# Patient Record
Sex: FEMALE | Race: WHITE | NOT HISPANIC OR LATINO | Employment: STUDENT | URBAN - METROPOLITAN AREA
[De-identification: names, ages, dates, MRNs, and addresses within clinical notes are randomized per-mention and may not be internally consistent; named-entity substitution may affect disease eponyms.]

---
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
## 2025-01-02 ASSESSMENT — SLEEP AND FATIGUE QUESTIONNAIRES
HOW LIKELY ARE YOU TO NOD OFF OR FALL ASLEEP WHILE SITTING INACTIVE IN A PUBLIC PLACE: WOULD NEVER DOZE
HOW LIKELY ARE YOU TO NOD OFF OR FALL ASLEEP WHILE WATCHING TV: MODERATE CHANCE OF DOZING
HOW LIKELY ARE YOU TO NOD OFF OR FALL ASLEEP IN A CAR, WHILE STOPPED FOR A FEW MINUTES IN TRAFFIC: WOULD NEVER DOZE
HOW LIKELY ARE YOU TO NOD OFF OR FALL ASLEEP WHEN YOU ARE A PASSENGER IN A CAR FOR AN HOUR WITHOUT A BREAK: SLIGHT CHANCE OF DOZING
HOW LIKELY ARE YOU TO NOD OFF OR FALL ASLEEP WHILE LYING DOWN TO REST IN THE AFTERNOON WHEN CIRCUMSTANCES PERMIT: MODERATE CHANCE OF DOZING
HOW LIKELY ARE YOU TO NOD OFF OR FALL ASLEEP WHILE SITTING QUIETLY AFTER LUNCH WITHOUT ALCOHOL: MODERATE CHANCE OF DOZING
HOW LIKELY ARE YOU TO NOD OFF OR FALL ASLEEP WHILE SITTING AND TALKING TO SOMEONE: WOULD NEVER DOZE
HOW LIKELY ARE YOU TO NOD OFF OR FALL ASLEEP WHILE SITTING AND READING: SLIGHT CHANCE OF DOZING

## 2025-01-07 ENCOUNTER — VIRTUAL VISIT (OUTPATIENT)
Dept: SLEEP MEDICINE | Facility: CLINIC | Age: 20
End: 2025-01-07
Attending: INTERNAL MEDICINE
Payer: COMMERCIAL

## 2025-01-07 VITALS — BODY MASS INDEX: 22.81 KG/M2 | HEIGHT: 69 IN | WEIGHT: 154 LBS

## 2025-01-07 DIAGNOSIS — G47.33 OBSTRUCTIVE SLEEP APNEA SYNDROME, MILD: ICD-10-CM

## 2025-01-07 DIAGNOSIS — F51.04 CHRONIC INSOMNIA: Primary | ICD-10-CM

## 2025-01-07 DIAGNOSIS — G25.81 RLS (RESTLESS LEGS SYNDROME): ICD-10-CM

## 2025-01-07 DIAGNOSIS — G47.21 CIRCADIAN RHYTHM SLEEP DISORDER, DELAYED SLEEP PHASE TYPE: ICD-10-CM

## 2025-01-07 PROCEDURE — 99207 PR NO CHARGE LOS: CPT | Mod: 95 | Performed by: PSYCHOLOGIST

## 2025-01-07 ASSESSMENT — SLEEP AND FATIGUE QUESTIONNAIRES
HOW LIKELY ARE YOU TO NOD OFF OR FALL ASLEEP WHILE SITTING QUIETLY AFTER LUNCH WITHOUT ALCOHOL: MODERATE CHANCE OF DOZING
HOW LIKELY ARE YOU TO NOD OFF OR FALL ASLEEP WHILE SITTING AND TALKING TO SOMEONE: WOULD NEVER DOZE
HOW LIKELY ARE YOU TO NOD OFF OR FALL ASLEEP WHEN YOU ARE A PASSENGER IN A CAR FOR AN HOUR WITHOUT A BREAK: SLIGHT CHANCE OF DOZING
HOW LIKELY ARE YOU TO NOD OFF OR FALL ASLEEP WHILE SITTING INACTIVE IN A PUBLIC PLACE: WOULD NEVER DOZE
HOW LIKELY ARE YOU TO NOD OFF OR FALL ASLEEP WHILE SITTING AND READING: SLIGHT CHANCE OF DOZING
HOW LIKELY ARE YOU TO NOD OFF OR FALL ASLEEP WHILE WATCHING TV: MODERATE CHANCE OF DOZING
HOW LIKELY ARE YOU TO NOD OFF OR FALL ASLEEP WHILE LYING DOWN TO REST IN THE AFTERNOON WHEN CIRCUMSTANCES PERMIT: MODERATE CHANCE OF DOZING
HOW LIKELY ARE YOU TO NOD OFF OR FALL ASLEEP IN A CAR, WHILE STOPPED FOR A FEW MINUTES IN TRAFFIC: WOULD NEVER DOZE

## 2025-01-07 ASSESSMENT — PAIN SCALES - GENERAL: PAINLEVEL_OUTOF10: NO PAIN (0)

## 2025-01-07 NOTE — PATIENT INSTRUCTIONS
Recommendations and Counselin.  Establish and maintain a consistent rise time of 9 AM and continue using your sumit simulator bright light box.    2.  Reduce your time in bed from over 12 hours to a 9-hour sleep window and delay your time to bed to midnight.    3.  In the later evening hours, engage in relaxing activities but avoid use of screens, devices or your computer.  Reading and relatively low light setting and other noncomputer-based activities are fine.    4.  Continue to use your body pillow to sleep on your side.  You may also want to consider getting a Slumber Bump or other positional device that will ensure you remain in the side or lateral position for treatment of your mild sleep apnea.    5.  Follow your doctors recommendations for use of gabapentin and for iron supplementation.

## 2025-01-07 NOTE — PROGRESS NOTES
Virtual Visit Details    Type of service:  Video Visit     Originating Location (pt. Location): Home    Distant Location (provider location):  Off-site  Platform used for Video Visit: AmWell                    Visit Start Time: 9:09 AM  Visit End Time:9:34 AM     SLEEP MEDICINE CONSULTATION  Behavioral Sleep Medicine  2025    Name: Lily Albert MRN# [id number]   Age: 19 year old YOB: 2005     Date of Consultation: 2025  Consultation is requested by: Donny Slater MD  [address] [place], [place] [id number]  Primary care provider: Billie Raman    Reason for Sleep Consultation     Lily Albert is a 19 year old female seen today for a behavioral sleep medicine consultation because of insomnia    Assessment and Plan     Sleep Diagnoses:       Chronic insomnia  Delayed Sleep Phase  Obstructive sleep apnea syndrome, mild  RLS (restless legs syndrome)    Co-occurring Conditions:    GERD  Osteoporosis  Major Depressive Disorder  Generalized Anxiety Disorder  Chronic Pain    Clinical Impressions:    Lily Albert was seen for a behavioral sleep medicine consultation and possible behavioral sleep intervention and treatment. History and clinical presentation suggests chronic multifactored insomnia associated with STACI, RLS, ANKIT, MDD and chronic pain.  This evaluation also suggests a moderate and likely endogenous delay in sleep phase often seen emerging in adolescence.  Subsequently this has resulted in patient spending markedly extended time in bed and developing unhelpful sleep effort and conditioned arousal because she is going to bed at least 3 hours earlier than her natural sleep time.    Recommendations and Counselin.  Establish and maintain a consistent rise time of 9 AM and continue using your sumit simulator bright light box.    2.  Reduce your time in bed from over 12 hours to a 9-hour sleep window and delay your time to bed to midnight.    3.  In the later  evening hours, engage in relaxing activities but avoid use of screens, devices or your computer.  Reading and relatively low light setting and other noncomputer-based activities are fine.    4.  Continue to use your body pillow to sleep on your side.  You may also want to consider getting a Slumber Bump or other positional device that will ensure you remain in the side or lateral position for treatment of your mild sleep apnea.    5.  Follow your doctors recommendations for use of gabapentin and for iron supplementation.    Lily was provided information about the pathophysiology of insomnia, psychophysiological factors contributing to the onset and maintenance of insomnia and how co-occurring medical conditions and intrinsic sleep disorders can affect sleep.  Treatment options were discussed  as applicable to patient specific sleep concerns and symptoms. The benefits and potential early side effects of treatment including increased daytime sleepiness were discussed.     Patient was advised to consult with their prescribing provider around use of or changes to prescription sleep medication.  Patient was counseled on the importance of avoiding driving if drowsy.    Services provided are compliant with the requirements of [place] Statute SS 256B.0625 Subd. 3b and paragraph (b)     Follow-up: 4 weeks     History of Present Sleep Complaint     Lily Albert is a 19 year old year old female who presents with the onset of sleep difficulties about 2 years ago.  She reports that she was able to fall asleep about 9-10 PM but then began having more difficulty with sleep initiation.  She states it would often take her till midnight or later to fall asleep.  She noticed that she became more concerned about this and frustrated that she had trouble falling asleep.  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  As part of this research and care, she completed a sleep sleep study which revealed very mild and mostly positional sleep apnea.  She was seen by sleep medicine, Dr. Donny Curtis, who recommended that she use a positional device or continue using her body pillow.  In doing so she states that she feels better during the day.    She is being treated for depression and anxiety.  She reports that these are generally fairly well-controlled at this time.  She does not report nocturnal rumination or worry.  She has become more concerned about her sleep as she is very active in school and notices that if she does not sleep well she has a hard time with classes and daily activity..      Activities in bed:  Watch TV, Use phone, computer, or tablet  Prescribed or OTC stimulants:  Concerta(Methylphenidate)  Prescribed or OTC sleep medication:  Melatonin and/or Benadryl occasionally  Previous sleep medications patients has tried:      SLEEP-WAKE SCHEDULE:    Work/School Days: Patient goes to school/work:  Yes     Time to Bed:   9pm  Sleep Latency:  2-3 hours to fall asleep  Difficulty falling asleep:   7 nights per week  Number of awakenings:  0-1 times due to  Pain, Uncertain  Trouble falling back asleep  every time I wake up  times a week   Time it takes to get back to sleep:  2-3 hours  Rise time:  7am  Uses alarm?  Yes    Weekends/Non-work Days/All Other Days    Usually gets into bed at  9-10pm   Sleep latency:   2-3 hours   Rise time:  8-9am   Uses alarm?  No    Patient sleep estimates:    Average total sleep time:   7 hours on school nights, 9 on other nights   Patient estimate of sleep need:  9 hours  Preferred sleep position(s):  Side     Naps    Intentional naps:  3 times a week  Duration:   ~3 hours in duration  Feels better after a nap:  No  Unintentional [place]zin days per week  Driving accident or near-miss due to sleepiness/drowsiness?  No    SLEEP DISRUPTIONS:    Breathing/Snoring    Patient  snores: Yes  Other people complain about Her snoring:  No  Patient has been told She stops breathing in Her sleep:  Yes  She has issues with any of the following:  Morning headaches, Stuffy nose when you wake up    Movement:    Patient gets pain, discomfort, with an urge to move:  Yes  Symptoms occur when She is resting:  Yes  Symptoms occur more at night: Yes  Patient has been told She kicks Her legs at night: Yes     Behaviors in Sleep:    Lily Albert has experienced the following behaviors while sleeping:  Recurring Nightmares, See or hear things that are not really there upon awakening or just falling asleep    She has experienced sudden muscle weakness during the day:  Yes    Caffeine, Alcohol and Other Substances:    Number of caffeinated beverages(per day:  0-1  Last caffeine use is usually:    Uses alcohol to promote sleep:  No  Drinks alcohol near bedtime:  No      FAMILY HISTORY OF SLEEP DISORDERS    Patient has a family member been diagnosed with a sleep disorder:  Yes   Father, Paternal Grandmother: Sleep Apnea           SCALES     EPWORTH SLEEPINESS SCALE      Sitting and reading  1   Watching TV  2   Sitting, inactive in a public place [place]theatre [place] [facility] [place]  0    As a passenger in a car  1   Lying down to rest in the afternoon when circumstance permit  2   Sitting and talking to someone  0   Sitting quietly after lunch without alcohol  2   In a car, while stopped for a few minutes in traffic  0   TOTAL SCORE (nl <11)  8     INSOMNIA SEVERITY INDEX       Difficulty falling asleep  3   Difficult staying asleep  0   Problems waking up to early  1   How SATISFIED/DISSATISFIED are you with your CURRENT sleep pattern?  3   How NOTICEABLE to others do you think your sleep pattern is in terms of your quality of life?  2   How WORRIED/DISTRESSED are you about your current sleep pattern?  2   To what extent do you consider your sleep problem to INTERFERE with your daily fuctioning(e.g. daytime fatigue, mood,  ability to function at work/daily chores, concentration, mood,etc.) CURRENTLY?  1   INSOMNIA SEVERITY INDEX TOTAL SCORE  12    Absence of insomnia (0-7); sub-threshold insomnia (8-14); moderate insomnia (15-21); and severe insomnia (22-28)    STOP BANG     1. Snoring: Do you snore loudly (louder than talking or loud enough to be heard through closed doors)?   No    2. Tired: Do you often feel tired, fatigued, or sleepy during daytime?   Yes    3. Observed: Has anyone observed you stop breathing during your sleep?   Yes    4. Blood pressure: Do you have or are you being treated for high blood pressure?   No    5. BMI: BMI more than 35 kg/m2?   No    6. Age: Age over 50 yr old?   No    7. Neck circumference: Neck circumference greater than 40 cm?   No    8. Gender: Gender male?   No    STOP-BANG Total Score:  2    STACI Risk  0-2 Low risk for STACI  3-4  Intermediate risk for STACI  5-8 High risk      PATIENT HEALTH QUESTIONNAIRE-9 (PHQ - 9)        3/7/2024     9:59 AM 6/13/2024     9:08 AM 9/17/2024     1:08 PM   PHQ   PHQ-9 Total Score 7 6 8   Q9: Thoughts of better off dead/self-harm past 2 weeks Not at all Not at all Not at all         ANKIT-7        1/4/2024    10:05 AM 3/7/2024    10:00 AM 6/13/2024     9:08 AM   ANKIT-7 SCORE   Total Score 11 (moderate anxiety) 7 (mild anxiety) 9 (mild anxiety)   Total Score 11 7 9                    Previous Sleep Consultations/Studies   Donny Curtis MD, sleep medicine fellow, 10/4/24:    Summary Sleep Diagnoses:  Chronic insomnia  Mild sleep apnea  Restless leg syndrome mostly manifested as periodic limb movements     Comorbid Diagnoses:  Connective tissue disease  Anxiety depression        Summary Recommendations:  Reviewed all of the treatments for obstructive sleep apnea.  Given her extremely mild sleep apnea and the predilection for supine position which she generally avoids treatment options would be limited to positional therapy, CPAP, oral appliance.  She wishes to proceed  with positional therapy alone which seems quite reasonable.  Wishes to avoid CPAP and oral appliance therapy     Reviewed treatments for restless legs which are not terribly troublesome to her currently.  She is on iron replacement and gabapentin.     Low quality sleep with difficulty falling asleep consistent with insomnia.  Comorbid chronic pain and affective disorder.         Vitals     There were no vitals taken for this visit.     Medical History     Allergies:    Allergies   Allergen Reactions    Adhesive Tape     Peanut (Diagnostic)      Not anaphylactic - worsens reflux    Tomato     Flagyl [Metronidazole]      palpitations and tachycardia from the tablet, gel was tolerated.    Liquid Adhesive Dermatitis       Medications:    Current Outpatient Medications   Medication Sig Dispense Refill    acetaminophen (TYLENOL) 500 MG tablet Take 500 mg by mouth daily as needed for mild pain Pt states taking 1-2 times/week.      amoxicillin (AMOXIL) 500 MG capsule Take 2,000 mg by mouth daily as needed Before dental work      aspirin-acetaminophen-caffeine (EXCEDRIN MIGRAINE) 250-250-65 MG tablet Take 1 tablet by mouth every 6 hours as needed for headaches      bisacodyl (DULCOLAX) 5 MG EC tablet Take 5 mg by mouth daily as needed for constipation      botulinum toxin type A (BOTOX) 100 units injection every 3 (three) months.      Calcium Citrate-Vitamin D (CALCIUM + D PO) Take 1 tablet by mouth every morning Calcium citrate 400 mg, vitamin D 500 international unit(s) (per 2 tabs): 1 tab daily      cetirizine (ZYRTEC) 10 MG tablet Take 10 mg by mouth every evening PM *NOTE: this is a study drug      Cholecalciferol (VITAMIN D3) 75 MCG (3000 UT) TABS Take 1 tablet by mouth every morning      diphenhydrAMINE (BENADRYL) 25 MG capsule Take 50 mg by mouth every 4 hours.      escitalopram (LEXAPRO) 20 MG tablet TAKE 1 TABLET(20 MG) BY MOUTH AT BEDTIME 90 tablet 1    ferrous sulfate (FEROSUL) 325 (65 Fe) MG tablet Take 1 tablet  (325 mg) by mouth every other day. 45 tablet 1    gabapentin (NEURONTIN) 600 MG tablet Take 1,200 mg by mouth 2 times daily AM and PM      hyoscyamine (LEVSIN) 0.125 MG tablet TAKE 1 TABLET(125 MCG) BY MOUTH DAILY IN THE MORNING 90 tablet 2    ibuprofen (ADVIL/MOTRIN) 200 MG tablet Take 400 mg by mouth daily as needed for mild pain      losartan (COZAAR) 25 MG tablet Take 25 mg by mouth 2 times daily      MAGNESIUM GLYCINATE PO Take 400 mg by mouth daily Evening dosing      methocarbamol (ROBAXIN) 750 MG tablet Take 750 mg by mouth.      methylphenidate HCl ER, OSM, (CONCERTA) 18 MG CR tablet Take 1 tablet (18 mg) by mouth daily. 30 tablet 0    [START ON 1/25/2025] methylphenidate HCl ER, OSM, (CONCERTA) 18 MG CR tablet Take 1 tablet (18 mg) by mouth daily. 30 tablet 0    [START ON 2/24/2025] methylphenidate HCl ER, OSM, (CONCERTA) 18 MG CR tablet Take 1 tablet (18 mg) by mouth daily. 30 tablet 0    methylphenidate HCl ER, OSM, (CONCERTA) 18 MG CR tablet Take 1 tablet (18 mg) by mouth every morning. 30 tablet 0    Multiple Vitamins-Minerals (DAILY MULTI VITAMIN/MINERALS PO) Take 1 split tablet by mouth every evening      OnabotulinumtoxinA (BOTOX IJ) Every 13 weeks      ondansetron (ZOFRAN) 8 MG tablet Take 8 mg by mouth every 8 hours as needed for nausea      oxyCODONE (ROXICODONE) 5 MG tablet Take 5 mg by mouth.      propranolol (INDERAL) 20 MG tablet Take 1 and 1/2 tablets by mouth every day as needed for palpitations      Riboflavin (VITAMIN B2 PO) Take 400 mg by mouth daily      rimegepant (NURTEC) 75 MG ODT tablet Take 75 mg by mouth daily as needed for migraine      senna-docusate (SENOKOT-S/PERICOLACE) 8.6-50 MG tablet Take 2 tablets by mouth daily At bedtime      tretinoin (RETIN-A) 0.025 % external cream Apply topically at bedtime Prescribed by [facility]         Problem List:  Patient Active Problem List    Diagnosis Date Noted    Nonrheumatic aortic (valve) insufficiency 10/04/2024      Priority: Medium    Insomnia due to medical condition 10/04/2024     Priority: Medium    Migraine headache 10/10/2022     Priority: Medium    Chronic psychogenic pain 09/29/2022     Priority: Medium    Other malaise 09/29/2022     Priority: Medium    Sacroiliac joint pain 06/24/2022     Priority: Medium    Suicide ideation 05/18/2022     Priority: Medium    Abnormality on bone densitometry 04/28/2022     Priority: Medium    Anemia due to chronic blood loss 04/28/2022     Priority: Medium    Generalized anxiety disorder 03/29/2022     Priority: Medium    Aortic root dilatation (H) 03/29/2022     Priority: Medium    Congenital anomaly of heart 03/29/2022     Priority: Medium    Gastroesophageal reflux disease 03/29/2022     Priority: Medium    History of traumatic brain injury 03/29/2022     Priority: Medium    Major depressive disorder, single episode, severe without psychotic features (H) 03/29/2022     Priority: Medium    Pain of foot 03/29/2022     Priority: Medium    Scoliosis 03/29/2022     Priority: Medium    Winged scapula 03/29/2022     Priority: Medium    Hypocalcemia 03/13/2021     Priority: Medium    Postprocedural hematoma of skin and subcutaneous tissue following other procedure 03/13/2021     Priority: Medium    Presence of other vascular implants and grafts 03/13/2021     Priority: Medium    Postoperative pain 03/11/2021     Priority: Medium    Tricuspid valve insufficiency 03/08/2021     Priority: Medium    Prolonged QT interval 03/08/2021     Priority: Medium    Thoracic aortic aneurysm (TAA) (H) 01/22/2021     Priority: Medium    Osteoporosis 08/05/2020     Priority: Medium    Pain in back 02/03/2020     Priority: Medium    Congenital deformity of spine 02/03/2020     Priority: Medium    Other specified systemic involvement of connective tissue (H) 02/03/2020     Priority: Medium     Formatting of this note might be different from the original.  Loeys Milady Syndrome  Formatting of this note might be  different from the original.  Loeys Milady Syndrome      Pectus carinatum 02/03/2020     Priority: Medium    Constipation 07/02/2018     Priority: Medium     Formatting of this note might be different from the original.  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  Loeys-Milady syndrome      Intracranial hemorrhage following injury (H) 03/06/2013     Priority: Medium        Past Medical/Surgical History:  Past Medical History:   Diagnosis Date    Chondral defect of femoral condyle     Chronic pain     Constipation     Depressive disorder     Eosinophilic esophagitis     ANKIT (generalized anxiety disorder)     Gastroesophageal reflux disease with esophagitis     History of blood transfusion 3/10/21    Heart Surgery    Loeys-Milady syndrome     Migraine with aura     Osteoporosis     Pectus carinatum     Scoliosis     Splenic cyst     Status post cardiac surgery     s/p Mitral valve repair in 2016, valve sparing aortic root replacement, tricuspid valve repair 3/2021     Patient Active Problem List    Diagnosis Date Noted    Nonrheumatic aortic (valve) insufficiency 10/04/2024     Priority: Medium    Insomnia due to medical condition 10/04/2024     Priority: Medium    Migraine headache 10/10/2022     Priority: Medium    Chronic psychogenic pain 09/29/2022     Priority: Medium    Other malaise 09/29/2022     Priority: Medium    Sacroiliac joint pain 06/24/2022     Priority: Medium    Suicide ideation 05/18/2022     Priority: Medium    Abnormality on bone densitometry 04/28/2022     Priority: Medium    Anemia due to chronic blood  loss 04/28/2022     Priority: Medium    Generalized anxiety disorder 03/29/2022     Priority: Medium    Aortic root dilatation (H) 03/29/2022     Priority: Medium    Congenital anomaly of heart 03/29/2022     Priority: Medium    Gastroesophageal reflux disease 03/29/2022     Priority: Medium    History of traumatic brain injury 03/29/2022     Priority: Medium    Major depressive disorder, single episode, severe without psychotic features (H) 03/29/2022     Priority: Medium    Pain of foot 03/29/2022     Priority: Medium    Scoliosis 03/29/2022     Priority: Medium    Winged scapula 03/29/2022     Priority: Medium    Hypocalcemia 03/13/2021     Priority: Medium    Postprocedural hematoma of skin and subcutaneous tissue following other procedure 03/13/2021     Priority: Medium    Presence of other vascular implants and grafts 03/13/2021     Priority: Medium    Postoperative pain 03/11/2021     Priority: Medium    Tricuspid valve insufficiency 03/08/2021     Priority: Medium    Prolonged QT interval 03/08/2021     Priority: Medium    Thoracic aortic aneurysm (TAA) (H) 01/22/2021     Priority: Medium    Osteoporosis 08/05/2020     Priority: Medium    Pain in back 02/03/2020     Priority: Medium    Congenital deformity of spine 02/03/2020     Priority: Medium    Other specified systemic involvement of connective tissue (H) 02/03/2020     Priority: Medium     Formatting of this note might be different from the original.  Loeys Milady Syndrome  Formatting of this note might be different from the original.  Loeys Milady Syndrome      Pectus carinatum 02/03/2020     Priority: Medium    Constipation 07/02/2018     Priority: Medium     Formatting of this note might be different from the original.  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  Loeys-Milady syndrome      Intracranial hemorrhage following injury (H) 03/06/2013     Priority: Medium     Patient Active Problem List   Diagnosis    Loeys-Milady syndrome    Generalized anxiety disorder    Aortic root dilatation (H)    Pain in back    Congenital anomaly of heart    Congenital deformity of spine    Other specified systemic involvement of connective tissue (H)    Constipation    Eosinophilic esophagitis    FTT (failure to thrive) in child    Gastroesophageal reflux disease    History of traumatic brain injury    Hypocalcemia    Intracranial hemorrhage following injury (H)    Major depressive disorder, single episode, severe without psychotic features (H)    Tricuspid valve insufficiency    Osteoporosis    Other specified congenital malformation syndromes, not elsewhere classified    Pain of foot    Pectus carinatum    Postoperative pain    Postprocedural hematoma of skin and subcutaneous tissue following other procedure    Presence of other vascular implants and grafts    Prolonged QT interval    Scoliosis    Thoracic aortic aneurysm (TAA) (H)    Winged scapula    Abnormality on bone densitometry    Anemia due to chronic blood loss    Suicide ideation    Sacroiliac joint pain    Chronic psychogenic pain    Migraine headache    Other malaise    Nonrheumatic aortic (valve) insufficiency    Insomnia due to medical condition        Most Recent Labs:  Lab on 09/05/2024   Component Date Value Ref Range Status    Color Urine 09/05/2024 Yellow  Colorless, Straw, Light Yellow, Yellow Final    Appearance Urine 09/05/2024 Clear  Clear Final    Glucose Urine 09/05/2024 Negative  Negative mg/dL Final    Bilirubin Urine 09/05/2024 Negative  Negative Final    Ketones Urine 09/05/2024 Negative  Negative mg/dL  Final    Specific Gravity Urine 09/05/2024 1.015  1.003 - 1.035 Final    Blood Urine 09/05/2024 Negative  Negative Final    pH Urine 09/05/2024 7.0  5.0 - 7.0 Final    Protein Albumin Urine 09/05/2024 Negative  Negative mg/dL Final    Urobilinogen Urine 09/05/2024 0.2  0.2, 1.0 E.U./dL Final    Nitrite Urine 09/05/2024 Negative  Negative Final    Leukocyte Esterase Urine 09/05/2024 Negative  Negative Final    Total Protein Urine mg/dL 09/05/2024 9.0    mg/dL Final    The reference ranges have not been established in urine protein. The results should be integrated into the clinical context for interpretation.    Total Protein Urine mg/mg Creat 09/05/2024 0.12  0.00 - 0.20 mg/mg Cr Final    Creatinine Urine mg/dL 09/05/2024 74.5  mg/dL Final    The reference ranges have not been established in urine creatinine. The results should be integrated into the clinical context for interpretation.  The reference ranges have not been established in urine creatinine. The results should be integrated into the clinical context for interpretation.    Chlamydia Trachomatis 09/05/2024 Negative  Negative Final    Negative for C. trachomatis rRNA by transcription mediated amplification.   A negative result by transcription mediated amplification does not preclude the presence of infection because results are dependent on proper and adequate collection, absence of inhibitors and sufficient rRNA to be detected.    Neisseria gonorrhoeae 09/05/2024 Negative  Negative Final    Negative for N. gonorrhoeae rRNA by transcription mediated amplification. A negative result by transcription mediated amplification does not preclude the presence of C. trachomatis infection because results are dependent on proper and adequate collection, absence of inhibitors and sufficient rRNA to be detected.    Calcium Urine mg/dL 09/05/2024 17.2  mg/dL Final    The reference ranges have not been established in urine calcium. The results should be integrated into  the clinical context for interpretation.    Calcium Urine g/g Cr 09/05/2024 0.23  0.05 - 0.27 g/g Cr Final    Creatinine Urine mg/dL 09/05/2024 74.5  mg/dL Final    The reference ranges have not been established in urine creatinine. The results should be integrated into the clinical context for interpretation.       Mental Health History     Prior Mental Health Diagnosis:   Major Depressive Disorder, Generalized Anxieety Disorder    Mental Health Treatment:   Primary care management, counseling    Chemical Abuse/Treatment:    None reported      10/4/2024     9:30 AM   CAGE-AID Total Score   Total Score 0   Total Score MyChart 0 (A total score of 2 or greater is considered clinically significant)     Family History     Family History   Problem Relation Age of Onset    Hypertension Father     Thyroid Disease Father     Asthma Brother     Breast Cancer Maternal Grandmother     Colon Cancer Maternal Grandmother     Prostate Cancer Maternal Grandfather     Diabetes Maternal Grandfather     Thyroid Disease Paternal Grandmother     Colon Polyps Maternal Aunt     Cervical Cancer Maternal Aunt     Anesthesia Reaction Maternal Aunt         Slow to wake    Venous thrombosis No family hx of        Social History         Social Drivers of Health     Food Insecurity: No Food Insecurity (5/30/2024)    Received from [facility]    Hunger Vital Sign     Worried About Running Out of Food in the Last Year: Never true     Ran Out of Food in the Last Year: Never true   Depression: Not at risk (10/4/2024)    PHQ-2     PHQ-2 Score: 1   Housing Stability: Low Risk  (5/30/2024)    Received from [facility]    Housing Stability     What is your living situation today?: I have a steady place to live   Tobacco Use: Low Risk  (10/4/2024)    Patient History     Smoking Tobacco Use: Never     Smokeless Tobacco Use: Never     Passive Exposure: Never   Financial Resource Strain: Low Risk  (1/4/2024)    Financial Resource Strain     Within the past  12 months, have you or your family members you live with been unable to get utilities (heat, electricity) when it was really needed?: No   Alcohol Use: Not At Risk (3/8/2021)    Received from [facility]    AUDIT-C     Frequency of Alcohol Consumption: Never     Average Number of Drinks: Not on file     Frequency of Binge Drinking: Not on file   Transportation Needs: No Transportation Needs (5/30/2024)    Received from [facility]    PRAPARE - Transportation     Lack of Transportation (Medical): No     Lack of Transportation (Non-Medical): No   Physical Activity: Insufficiently Active (5/30/2024)    Received from [facility]    Exercise Vital Sign     Days of Exercise per Week: 2 days     Minutes of Exercise per Session: 20 min   Interpersonal Safety: Low Risk  (4/5/2024)    Interpersonal Safety     Do you feel physically and emotionally safe where you currently live?: Yes     Within the past 12 months, have you been hit, slapped, kicked or otherwise physically hurt by someone?: No     Within the past 12 months, have you been humiliated or emotionally abused in other ways by your partner or ex-partner?: No   Stress: Stress Concern Present (1/16/2023)    Received from [facility], [facility]    [employer] - Occupational Stress Questionnaire     Feeling of Stress : Rather much   Social Connections: Not on file   Health Literacy: Not on file         Mental Status Examination     Lily presented as oriented X3 with speech and language intact.  The patient was cooperative throughout the evaluation with no signs of hallucinations, delusions, loosening of associations or other thought disturbance.  Mood was normal Affect was congruent with mood. Insight and judgement were intact.  Memory appeared intact for recent and remote elements.  There was no report of suicidal ideation, intention or plan. Attention and concentration were within normal.        Castro Moody,  ERMELINDA Au, PAYALM  Diplomate, Behavioral Sleep Medicine  [facility]      Copy:   Billie Raman MD  [place] [address]    Note: This dictation was created using voice recognition software. This document may contain an error not identified before finalizing the document. If the error changes the accuracy of the document, I would appreciate it being brought to my attention.

## 2025-01-07 NOTE — NURSING NOTE
Current patient location:  [place], [place]    Is the patient currently in the state of [place]? YES    Visit mode:VIDEO    If the visit is dropped, the patient can be reconnected by:VIDEO VISIT: Text to cell phone:   Telephone Information:   Mobile [phone]       Will anyone else be joining the visit? NO  (If patient encounters technical issues they should call [phone]    Are changes needed to the allergy or medication list? No    Are refills needed on medications prescribed by this physician? NO    Rooming Documentation:  Questionnaire(s) completed    Reason for visit: Consult    Sangeeta JOHANSEN

## 2025-01-28 ENCOUNTER — APPOINTMENT (OUTPATIENT)
Dept: URBAN - METROPOLITAN AREA CLINIC 260 | Age: 20
Setting detail: DERMATOLOGY
End: 2025-01-28

## 2025-01-28 VITALS — HEIGHT: 69 IN | WEIGHT: 160 LBS

## 2025-01-28 DIAGNOSIS — L72.0 EPIDERMAL CYST: ICD-10-CM

## 2025-01-28 DIAGNOSIS — L70.0 ACNE VULGARIS: ICD-10-CM

## 2025-01-28 PROCEDURE — 10040 ACNE SURGERY: CPT

## 2025-01-28 PROCEDURE — OTHER COUNSELING: OTHER

## 2025-01-28 PROCEDURE — OTHER ACNE SURGERY: OTHER

## 2025-01-28 PROCEDURE — OTHER MIPS QUALITY: OTHER

## 2025-01-28 PROCEDURE — OTHER PRESCRIPTION MEDICATION MANAGEMENT: OTHER

## 2025-01-28 PROCEDURE — 99213 OFFICE O/P EST LOW 20 MIN: CPT | Mod: 25

## 2025-01-28 ASSESSMENT — LOCATION DETAILED DESCRIPTION DERM
LOCATION DETAILED: RIGHT INFERIOR MEDIAL FOREHEAD
LOCATION DETAILED: LEFT MEDIAL MALAR CHEEK
LOCATION DETAILED: RIGHT SUPERIOR NASAL CHEEK
LOCATION DETAILED: LEFT INFERIOR FOREHEAD
LOCATION DETAILED: LEFT CENTRAL MALAR CHEEK
LOCATION DETAILED: RIGHT CENTRAL MALAR CHEEK

## 2025-01-28 ASSESSMENT — LOCATION SIMPLE DESCRIPTION DERM
LOCATION SIMPLE: LEFT CHEEK
LOCATION SIMPLE: RIGHT CHEEK
LOCATION SIMPLE: RIGHT FOREHEAD
LOCATION SIMPLE: LEFT FOREHEAD

## 2025-01-28 ASSESSMENT — LOCATION ZONE DERM: LOCATION ZONE: FACE

## 2025-01-28 NOTE — PROCEDURE: COUNSELING
Topical Clindamycin Pregnancy And Lactation Text: This medication is Pregnancy Category B and is considered safe during pregnancy. It is unknown if it is excreted in breast milk.
Dapsone Pregnancy And Lactation Text: This medication is Pregnancy Category C and is not considered safe during pregnancy or breast feeding.
Azithromycin Counseling:  I discussed with the patient the risks of azithromycin including but not limited to GI upset, allergic reaction, drug rash, diarrhea, and yeast infections.
High Dose Vitamin A Counseling: Side effects reviewed, pt to contact office should one occur.
Spironolactone Pregnancy And Lactation Text: This medication can cause feminization of the male fetus and should be avoided during pregnancy. The active metabolite is also found in breast milk.
Tetracycline Pregnancy And Lactation Text: This medication is Pregnancy Category D and not consider safe during pregnancy. It is also excreted in breast milk.
Minocycline Counseling: Patient advised regarding possible photosensitivity and discoloration of the teeth, skin, lips, tongue and gums.  Patient instructed to avoid sunlight, if possible.  When exposed to sunlight, patients should wear protective clothing, sunglasses, and sunscreen.  The patient was instructed to call the office immediately if the following severe adverse effects occur:  hearing changes, easy bruising/bleeding, severe headache, or vision changes.  The patient verbalized understanding of the proper use and possible adverse effects of minocycline.  All of the patient's questions and concerns were addressed.
Bactrim Counseling:  I discussed with the patient the risks of sulfa antibiotics including but not limited to GI upset, allergic reaction, drug rash, diarrhea, dizziness, photosensitivity, and yeast infections.  Rarely, more serious reactions can occur including but not limited to aplastic anemia, agranulocytosis, methemoglobinemia, blood dyscrasias, liver or kidney failure, lung infiltrates or desquamative/blistering drug rashes.
Doxycycline Pregnancy And Lactation Text: This medication is Pregnancy Category D and not consider safe during pregnancy. It is also excreted in breast milk but is considered safe for shorter treatment courses.
Topical Retinoid counseling:  Patient advised to apply a pea-sized amount only at bedtime and wait 30 minutes after washing their face before applying.  If too drying, patient may add a non-comedogenic moisturizer. The patient verbalized understanding of the proper use and possible adverse effects of retinoids.  All of the patient's questions and concerns were addressed.
Benzoyl Peroxide Counseling: Patient counseled that medicine may cause skin irritation and bleach clothing.  In the event of skin irritation, the patient was advised to reduce the amount of the drug applied or use it less frequently.   The patient verbalized understanding of the proper use and possible adverse effects of benzoyl peroxide.  All of the patient's questions and concerns were addressed.
Topical Sulfur Applications Pregnancy And Lactation Text: This medication is Pregnancy Category C and has an unknown safety profile during pregnancy. It is unknown if this topical medication is excreted in breast milk.
Bactrim Pregnancy And Lactation Text: This medication is Pregnancy Category D and is known to cause fetal risk.  It is also excreted in breast milk.
Erythromycin Counseling:  I discussed with the patient the risks of erythromycin including but not limited to GI upset, allergic reaction, drug rash, diarrhea, increase in liver enzymes, and yeast infections.
Winlevi Counseling:  I discussed with the patient the risks of topical clascoterone including but not limited to erythema, scaling, itching, and stinging. Patient voiced their understanding.
Topical Retinoid Pregnancy And Lactation Text: This medication is Pregnancy Category C. It is unknown if this medication is excreted in breast milk.
Aklief counseling:  Patient advised to apply a pea-sized amount only at bedtime and wait 30 minutes after washing their face before applying.  If too drying, patient may add a non-comedogenic moisturizer.  The most commonly reported side effects including irritation, redness, scaling, dryness, stinging, burning, itching, and increased risk of sunburn.  The patient verbalized understanding of the proper use and possible adverse effects of retinoids.  All of the patient's questions and concerns were addressed.
Birth Control Pills Pregnancy And Lactation Text: This medication should be avoided if pregnant and for the first 30 days post-partum.
Isotretinoin Counseling: Patient should get monthly blood tests, not donate blood, not drive at night if vision affected, not share medication, and not undergo elective surgery for 6 months after tx completed. Side effects reviewed, pt to contact office should one occur.
Azelaic Acid Counseling: Patient counseled that medicine may cause skin irritation and to avoid applying near the eyes.  In the event of skin irritation, the patient was advised to reduce the amount of the drug applied or use it less frequently.   The patient verbalized understanding of the proper use and possible adverse effects of azelaic acid.  All of the patient's questions and concerns were addressed.
Tazorac Pregnancy And Lactation Text: This medication is not safe during pregnancy. It is unknown if this medication is excreted in breast milk.
Benzoyl Peroxide Pregnancy And Lactation Text: This medication is Pregnancy Category C. It is unknown if benzoyl peroxide is excreted in breast milk.
Tetracycline Counseling: Patient counseled regarding possible photosensitivity and increased risk for sunburn.  Patient instructed to avoid sunlight, if possible.  When exposed to sunlight, patients should wear protective clothing, sunglasses, and sunscreen.  The patient was instructed to call the office immediately if the following severe adverse effects occur:  hearing changes, easy bruising/bleeding, severe headache, or vision changes.  The patient verbalized understanding of the proper use and possible adverse effects of tetracycline.  All of the patient's questions and concerns were addressed. Patient understands to avoid pregnancy while on therapy due to potential birth defects.
Azithromycin Pregnancy And Lactation Text: This medication is considered safe during pregnancy and is also secreted in breast milk.
Doxycycline Counseling:  Patient counseled regarding possible photosensitivity and increased risk for sunburn.  Patient instructed to avoid sunlight, if possible.  When exposed to sunlight, patients should wear protective clothing, sunglasses, and sunscreen.  The patient was instructed to call the office immediately if the following severe adverse effects occur:  hearing changes, easy bruising/bleeding, severe headache, or vision changes.  The patient verbalized understanding of the proper use and possible adverse effects of doxycycline.  All of the patient's questions and concerns were addressed.
High Dose Vitamin A Pregnancy And Lactation Text: High dose vitamin A therapy is contraindicated during pregnancy and breast feeding.
Topical Clindamycin Counseling: Patient counseled that this medication may cause skin irritation or allergic reactions.  In the event of skin irritation, the patient was advised to reduce the amount of the drug applied or use it less frequently.   The patient verbalized understanding of the proper use and possible adverse effects of clindamycin.  All of the patient's questions and concerns were addressed.
Spironolactone Counseling: Patient advised regarding risks of diarrhea, abdominal pain, hyperkalemia, birth defects (for female patients), liver toxicity and renal toxicity. The patient may need blood work to monitor liver and kidney function and potassium levels while on therapy. The patient verbalized understanding of the proper use and possible adverse effects of spironolactone.  All of the patient's questions and concerns were addressed.
Azelaic Acid Pregnancy And Lactation Text: This medication is considered safe during pregnancy and breast feeding.
Dapsone Counseling: I discussed with the patient the risks of dapsone including but not limited to hemolytic anemia, agranulocytosis, rashes, methemoglobinemia, kidney failure, peripheral neuropathy, headaches, GI upset, and liver toxicity.  Patients who start dapsone require monitoring including baseline LFTs and weekly CBCs for the first month, then every month thereafter.  The patient verbalized understanding of the proper use and possible adverse effects of dapsone.  All of the patient's questions and concerns were addressed.
Isotretinoin Pregnancy And Lactation Text: This medication is Pregnancy Category X and is considered extremely dangerous during pregnancy. It is unknown if it is excreted in breast milk.
Include Pregnancy/Lactation Warning?: No
Detail Level: Zone
Topical Sulfur Applications Counseling: Topical Sulfur Counseling: Patient counseled that this medication may cause skin irritation or allergic reactions.  In the event of skin irritation, the patient was advised to reduce the amount of the drug applied or use it less frequently.   The patient verbalized understanding of the proper use and possible adverse effects of topical sulfur application.  All of the patient's questions and concerns were addressed.
Tazorac Counseling:  Patient advised that medication is irritating and drying.  Patient may need to apply sparingly and wash off after an hour before eventually leaving it on overnight.  The patient verbalized understanding of the proper use and possible adverse effects of tazorac.  All of the patient's questions and concerns were addressed.
Birth Control Pills Counseling: Birth Control Pill Counseling: I discussed with the patient the potential side effects of OCPs including but not limited to increased risk of stroke, heart attack, thrombophlebitis, deep venous thrombosis, hepatic adenomas, breast changes, GI upset, headaches, and depression.  The patient verbalized understanding of the proper use and possible adverse effects of OCPs. All of the patient's questions and concerns were addressed.
Erythromycin Pregnancy And Lactation Text: This medication is Pregnancy Category B and is considered safe during pregnancy. It is also excreted in breast milk.
Winlevi Pregnancy And Lactation Text: This medication is considered safe during pregnancy and breastfeeding.
Aklief Pregnancy And Lactation Text: It is unknown if this medication is safe to use during pregnancy.  It is unknown if this medication is excreted in breast milk.  Breastfeeding women should use the topical cream on the smallest area of the skin for the shortest time needed while breastfeeding.  Do not apply to nipple and areola.
Sarecycline Counseling: Patient advised regarding possible photosensitivity and discoloration of the teeth, skin, lips, tongue and gums.  Patient instructed to avoid sunlight, if possible.  When exposed to sunlight, patients should wear protective clothing, sunglasses, and sunscreen.  The patient was instructed to call the office immediately if the following severe adverse effects occur:  hearing changes, easy bruising/bleeding, severe headache, or vision changes.  The patient verbalized understanding of the proper use and possible adverse effects of sarecycline.  All of the patient's questions and concerns were addressed.

## 2025-01-28 NOTE — PROCEDURE: ACNE SURGERY
Render Post-Care Instructions In Note?: no
Extraction Method: 11 blade and q-tip
Post-Care Instructions: I reviewed with the patient in detail post-care instructions. Patient is to keep the treatment areas dry overnight, and then apply bacitracin twice daily until healed. Patient may apply hydrogen peroxide soaks to remove any crusting.
Prep Text (Optional): Prior to removal the treatment areas were prepped in the usual fashion.
Consent was obtained and risks were reviewed including but not limited to scarring, infection, bleeding, scabbing, incomplete removal, and allergy to anesthesia.
Acne Type: Comedonal Lesions
Detail Level: Detailed

## 2025-02-03 ASSESSMENT — SLEEP AND FATIGUE QUESTIONNAIRES
HOW LIKELY ARE YOU TO NOD OFF OR FALL ASLEEP WHILE SITTING AND READING: SLIGHT CHANCE OF DOZING
HOW LIKELY ARE YOU TO NOD OFF OR FALL ASLEEP WHILE SITTING AND TALKING TO SOMEONE: WOULD NEVER DOZE
HOW LIKELY ARE YOU TO NOD OFF OR FALL ASLEEP IN A CAR, WHILE STOPPED FOR A FEW MINUTES IN TRAFFIC: WOULD NEVER DOZE
HOW LIKELY ARE YOU TO NOD OFF OR FALL ASLEEP WHEN YOU ARE A PASSENGER IN A CAR FOR AN HOUR WITHOUT A BREAK: SLIGHT CHANCE OF DOZING
HOW LIKELY ARE YOU TO NOD OFF OR FALL ASLEEP WHILE SITTING INACTIVE IN A PUBLIC PLACE: WOULD NEVER DOZE
HOW LIKELY ARE YOU TO NOD OFF OR FALL ASLEEP WHILE WATCHING TV: MODERATE CHANCE OF DOZING
HOW LIKELY ARE YOU TO NOD OFF OR FALL ASLEEP WHILE LYING DOWN TO REST IN THE AFTERNOON WHEN CIRCUMSTANCES PERMIT: MODERATE CHANCE OF DOZING
HOW LIKELY ARE YOU TO NOD OFF OR FALL ASLEEP WHILE SITTING QUIETLY AFTER LUNCH WITHOUT ALCOHOL: MODERATE CHANCE OF DOZING

## 2025-02-04 ENCOUNTER — VIRTUAL VISIT (OUTPATIENT)
Dept: SLEEP MEDICINE | Facility: CLINIC | Age: 20
End: 2025-02-04
Payer: COMMERCIAL

## 2025-02-04 ENCOUNTER — TELEPHONE (OUTPATIENT)
Dept: BEHAVIORAL HEALTH | Facility: CLINIC | Age: 20
End: 2025-02-04

## 2025-02-04 VITALS — HEIGHT: 69 IN | WEIGHT: 150 LBS | BODY MASS INDEX: 22.22 KG/M2

## 2025-02-04 DIAGNOSIS — G47.33 OBSTRUCTIVE SLEEP APNEA SYNDROME, MILD: ICD-10-CM

## 2025-02-04 DIAGNOSIS — F51.04 CHRONIC INSOMNIA: Primary | ICD-10-CM

## 2025-02-04 PROCEDURE — 90832 PSYTX W PT 30 MINUTES: CPT | Mod: 95 | Performed by: PSYCHOLOGIST

## 2025-02-04 ASSESSMENT — PAIN SCALES - GENERAL: PAINLEVEL_OUTOF10: MILD PAIN (3)

## 2025-02-04 NOTE — TELEPHONE ENCOUNTER
Pt reminder call for 02/05 TC appointment. VM left with TC info.     Sonya Avila  02/04/2025  2024

## 2025-02-04 NOTE — PROGRESS NOTES
Virtual Visit Details    Type of service:  Video Visit     Originating Location (pt. Location): Home    Distant Location (provider location):  Off-site  Platform used for Video Visit: AmWell          Visit Start Time: 9:31 AM  Visit End Time:9:50 AM       SLEEP MEDICINE VIRTUAL FOLLOW-UP VISIT  Behavioral Sleep Medicine    Patient Name: Lily Albert  MRN:  [id number]  Date of Service: 2025       Subjective Report     Lily Albert  returns for a telehealth video visit to discuss progress in implementing behavioral strategies for the management of insomnia and restless legs with mild sleep apnea .  Patient consent for initiation of video visit was obtained and documented prior to initiation of visit.     Lily reports she began iron supplementation which has helped her RLS significantly.  She has .  Also implement stimulus control strategies at reducing use of screens and devices in the later evening and avoiding going to bed until she feels sleepy.  She states that the process of going to bed is more comfortable because of the reduction in symptoms in her legs.  She is meeting with her primary care for follow-up to check her iron and ferritin levels in April.     Initial Recommendations and Counselin.  Establish and maintain a consistent rise time of 9 AM and continue using your sumit simulator bright light box.     2.  Reduce your time in bed from over 12 hours to a 9-hour sleep window and delay your time to bed to midnight.     3.  In the later evening hours, engage in relaxing activities but avoid use of screens, devices or your computer.  Reading and relatively low light setting and other noncomputer-based activities are fine.     4.  Continue to use your body pillow to sleep on your side.  You may also want to consider getting a Slumber Bump or other positional device that will ensure you remain in the side or lateral position for treatment of your mild sleep apnea.     5.  Follow  your doctors recommendations for use of gabapentin and for iron supplementation.     Sleep Data:     Source of Sleep Estimates:  Verbal Self-report    Average Time in Bed: 7-8 hrs.  Average Total Sleep Time: 7 hrs  Sleep Efficiency estimate: Greater than 85%      EPWORTH SLEEPINESS SCALE    Sitting and reading  1   Watching TV  2   Sitting, inactive in a public place (theatre or mtg.)  0    As a passenger in a car  1   Lying down to rest in the afternoon when circumstance permit  2   Sitting and talking to someone  0   Sitting quietly after lunch without alcohol  2   In a car, while stopped for a few minutes in traffic  0   TOTAL SCORE (nl <11)  8     INSOMNIA SEVERITY INDEX     Difficulty falling asleep  1   Difficult staying asleep  0   Problems waking up to early  1   How SATISFIED/DISSATISFIED are you with your CURRENT sleep pattern?  2   How NOTICEABLE to others do you think your sleep pattern is in terms of your quality of life?  1   How WORRIED/DISTRESSED are you about your current sleep pattern?  2   To what extent do you consider your sleep problem to INTERFERE with your daily fuctioning(e.g. daytime fatigue, mood, ability to function at work/daily chores, concentration, mood,etc.) CURRENTLY?  1   INSOMNIA SEVERITY INDEX TOTAL SCORE  8    Absence of insomnia (0-7); sub-threshold insomnia (8-14); moderate insomnia (15-21); and severe insomnia (22-28)     Interventions     Strategies and recommendations including  treatment of restless leg syndrome, positional therapy for sleep apnea and Stimulus control therapy were reviewed and discussed today.     Services provided are compliant with the requirements of [place] Statute SS 256B.0625 Subd. 3b and paragraph (b)       Vital Signs     There were no vitals taken for this visit.     Mental Status     Orientation:  X3  Mood:  normal  Affect:  Congruent with mood  Speech/Language:  Normal  Thought Process: Intact  Associations:  Normal  Thought Content:  Normal  Patient does not report any suicidal ideation, intention or plan.    Diagnostic Impressions and Plan        Chronic insomnia  Obstructive sleep apnea syndrome, mild    At follow-up, patient reports she began iron supplementation and is finding it very helpful.  She reports virtual elimination of discomforting restlessness and uncomfortable sensations in the evening and making it easier to fall asleep.  She has also implemented stimulus control recommendations from behavioral sleep medicine clinic and is waiting to go to bed and she feels sleepy.  She is keeping a consistent wake time.  There is near complete resolution of insomnia symptoms.    Plan:  Continue current sleep schedule and plan    Follow-up: as needed if symptoms recur      Castro Moody PsyD, ERMELINDA, DBSM  Diplomate, Behavioral Sleep Medicine  [facility]      Note: This dictation was created using voice recognition software. This document may contain an error not identified before finalizing the document. If the error changes the accuracy of the document, I would appreciate it being brought to my attention.

## 2025-02-04 NOTE — NURSING NOTE
Current patient location:  Pt in [place]     Is the patient currently in the state of [place]? YES    Visit mode: VIDEO    If the visit is dropped, the patient can be reconnected by:VIDEO VISIT: Send to e-mail at: [email or web address]    Will anyone else be joining the visit? NO  (If patient encounters technical issues they should call [phone]    Are changes needed to the allergy or medication list? No    Are refills needed on medications prescribed by this physician? NO    Rooming Documentation:  Questionnaire(s) completed    Reason for visit: RECHECK    Jo JOHANSEN

## 2025-02-05 ENCOUNTER — VIRTUAL VISIT (OUTPATIENT)
Dept: BEHAVIORAL HEALTH | Facility: CLINIC | Age: 20
End: 2025-02-05
Payer: COMMERCIAL

## 2025-02-05 ENCOUNTER — TELEPHONE (OUTPATIENT)
Dept: BEHAVIORAL HEALTH | Facility: CLINIC | Age: 20
End: 2025-02-05
Payer: COMMERCIAL

## 2025-02-05 DIAGNOSIS — F41.1 GENERALIZED ANXIETY DISORDER: ICD-10-CM

## 2025-02-05 DIAGNOSIS — Q87.89 LOEYS-DIETZ SYNDROME: Primary | ICD-10-CM

## 2025-02-05 DIAGNOSIS — F33.1 MAJOR DEPRESSIVE DISORDER, RECURRENT EPISODE, MODERATE WITH ANXIOUS DISTRESS (H): Primary | ICD-10-CM

## 2025-02-05 DIAGNOSIS — M81.0 OSTEOPOROSIS, UNSPECIFIED OSTEOPOROSIS TYPE, UNSPECIFIED PATHOLOGICAL FRACTURE PRESENCE: ICD-10-CM

## 2025-02-05 PROCEDURE — 99207 PR DROP WITH A PROCEDURE: CPT | Mod: 25

## 2025-02-05 ASSESSMENT — ANXIETY QUESTIONNAIRES
2. NOT BEING ABLE TO STOP OR CONTROL WORRYING: MORE THAN HALF THE DAYS
3. WORRYING TOO MUCH ABOUT DIFFERENT THINGS: SEVERAL DAYS
GAD7 TOTAL SCORE: 14
GAD7 TOTAL SCORE: 14
5. BEING SO RESTLESS THAT IT IS HARD TO SIT STILL: SEVERAL DAYS
6. BECOMING EASILY ANNOYED OR IRRITABLE: MORE THAN HALF THE DAYS
1. FEELING NERVOUS, ANXIOUS, OR ON EDGE: MORE THAN HALF THE DAYS
7. FEELING AFRAID AS IF SOMETHING AWFUL MIGHT HAPPEN: NEARLY EVERY DAY

## 2025-02-05 ASSESSMENT — PATIENT HEALTH QUESTIONNAIRE - PHQ9
5. POOR APPETITE OR OVEREATING: NEARLY EVERY DAY
SUM OF ALL RESPONSES TO PHQ QUESTIONS 1-9: 11

## 2025-02-05 ASSESSMENT — COLUMBIA-SUICIDE SEVERITY RATING SCALE - C-SSRS
ATTEMPT SINCE LAST CONTACT: NO
6. HAVE YOU EVER DONE ANYTHING, STARTED TO DO ANYTHING, OR PREPARED TO DO ANYTHING TO END YOUR LIFE?: NO
2. HAVE YOU ACTUALLY HAD ANY THOUGHTS OF KILLING YOURSELF?: NO
TOTAL  NUMBER OF INTERRUPTED ATTEMPTS SINCE LAST CONTACT: NO
SUICIDE, SINCE LAST CONTACT: NO
TOTAL  NUMBER OF ABORTED OR SELF INTERRUPTED ATTEMPTS SINCE LAST CONTACT: NO
1. SINCE LAST CONTACT, HAVE YOU WISHED YOU WERE DEAD OR WISHED YOU COULD GO TO SLEEP AND NOT WAKE UP?: NO

## 2025-02-05 NOTE — PROGRESS NOTES
"    [facility]         PATIENT'S NAME: Lily Albert  PREFERRED NAME: Lily  PRONOUNS:       MRN: [id number]  : 2005  ADDRESS: [address] [address]  ACCT. NUMBER:  [id number]  DATE OF SERVICE: 25  START TIME: 1:30pm  END TIME: 2:30pm  PREFERRED PHONE: [phone]  May we leave a program related message: Yes  EMERGENCY CONTACT: was not obtained pt declined .  SERVICE MODALITY:  Video Visit:      Provider verified identity through the following two step process.  Patient provided:  Patient  and Patient address    Telemedicine Visit: The patient's condition can be safely assessed and treated via synchronous audio and visual telemedicine encounter.      Reason for Telemedicine Visit: Patient convenience (e.g. access to timely appointments / distance to available provider)    Originating Site (Patient Location): Patient's home    Distant Site (Provider Location): [facility]    Consent:  The patient/guardian has verbally consented to: the potential risks and benefits of telemedicine (video visit) versus in person care; bill my insurance or make self-payment for services provided; and responsibility for payment of non-covered services.     Patient would like the video invitation sent by:  My Chart    Mode of Communication:  Video Conference via [facility]    Distant Location (Provider):  Off-site    As the provider I attest to compliance with applicable laws and regulations related to telemedicine.    UNIVERSAL ADULT Mental Health DIAGNOSTIC ASSESSMENT    Identifying Information:  Patient is a 19 year old,   individual.  Patient was referred for an assessment by other.  Patient attended the session alone.  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    Chief Complaint:   The reason for seeking services at this time is: \"Mental Health\". " " The problem(s) began \"its been years, but has worsened recently with physical health\".   Pt is a 19 year old who is seen for a diagnostic assessment for programmatic care.  Pt is referred by [facility] ED/ care team . Pt reported an increase in mental health sxs, stressors, and functional impairment, looking for more support and higher LOC.  Pt recently seen in ED for mental health sxs around 1/29/25.    Per chart review, pt with a hx of MDD, ANKIT and notable psych hosp hx 5/2021 [facility], with information available in epic ehr at the time of this note. Pt self-reports a hx of adhd.  Pt denies a hx of IOP/PHP.  Pt today, endorses depression sxs including sadness, anhedonia, change in sleep, change in appetite, fatigue, more days than not in the same period, consistent with MDD, PHQ9 screener appears congruent.  Pt endorses anxiety sxs including excessive worry, difficulty controlling the worry, ruminations social anxiety, restlessness, more days than not in the same 2 week period, consistent with ANKIT, GAD7 screener appears congruent.  Pt denies current trauma sxs upon inquiry.  Pt denies current SI, plan, intent, SIB, AH/VH, and/or rogelio sxs, at this point in time.  CSSR completed.  Pt reports hopeful, looking forward to graduating college for nursing\".   Pt reports medication compliance, adequate supports, and denies current substance use.  Pt reports the following protective factors:  willingness to engage in therapy/program, attached by fam/friends, access to variety of clinical interventions, committed to sobriety, educated, good listener, has a previous history of therapy, insightful, intelligent, open to learning, open to suggestions / feedback, and support of family, friends and providers, hopeful, identifies reason for living, access to and engagement with healthcare, current engagement in treatment and/or motivation to establish therapeutic relationship, and lives in a responsibly safe environment forward or " "future oriented thinking; purpose;  .     Patient has attempted to resolve these concerns in the past through individual therapy, medication management, IP .    Social/Family History:  Patient reported they grew up in other [place].  They were raised by biological parents  .  Parents were always together.  Patient reported that their childhood was \"my relationship with my parents got jacobo as a later teenager\".  Patient described their current relationships with family of origin as \"its more stable and meaningful now, they are supportive\".    The patient describes their cultural background as .  Cultural influences and impact on patient's life structure, values, norms, and healthcare: N/A.  Contextual influences on patient's health include: Individual Factors mental health .    These factors will be addressed in the Preliminary Treatment plan. Patient identified their preferred language to be English. Patient reported they does not need the assistance of an  or other support involved in therapy.     Patient reported experienced significant delays in developmental tasks, such as Pt reports had gross motor delay, did not start walking until around 2 years old\" .   Patient's highest education level was some college  .  Patient identified the following learning problems: attention and concentration.  Modifications will not be used to assist communication in therapy.  Patient reports they are  able to understand written materials.    Patient reported the following relationship history single.  Patient's current relationship status is single .   Patient identified their sexual orientation as bi-sexual.  Patient reported having  0 child(jt). Patient identified parents; siblings; pets as part of their support system.  Patient identified the quality of these relationships as good,  .      Patient's current living/housing situation involves staying with someone.  The immediate members of family and " household include Shelby Albert, 51,Mother and they report that housing is stable.    Patient is currently unemployed.  Patient reports their finances are obtained through parents. Patient does not identify finances as a current stressor.      Patient reported that they have not been involved with the legal system.  Patient does not report being under probation/ parole/ jurisdiction. They are not under any current court jurisdiction. .    Patient's Strengths and Limitations:  Patient identified the following strengths or resources that will help them succeed in treatment: friends / good social support, family support, insight, intelligence, positive school connection, and motivation. Things that may interfere with the patient's success in treatment include: physical health concerns.     Assessments:  The following assessments were completed by patient for this visit:  PHQ9:       12/15/2023     6:51 AM 1/4/2024    10:04 AM 2/2/2024     9:46 AM 3/7/2024     9:59 AM 6/13/2024     9:08 AM 9/17/2024     1:08 PM 2/5/2025     1:36 PM   PHQ-9 SCORE   PHQ-9 Total Score MyChart 11 (Moderate depression) 12 (Moderate depression) 6 (Mild depression) 7 (Mild depression) 6 (Mild depression) 8 (Mild depression)    PHQ-9 Total Score 11 12 6 7 6 8 11     GAD7:       3/29/2022     4:49 PM 6/16/2022     8:03 AM 8/8/2022    10:35 AM 1/4/2024    10:05 AM 3/7/2024    10:00 AM 6/13/2024     9:08 AM 2/5/2025     1:36 PM   ANKIT-7 SCORE   Total Score 9 (mild anxiety) 10 (moderate anxiety) 9 (mild anxiety) 11 (moderate anxiety) 7 (mild anxiety) 9 (mild anxiety)    Total Score 9 10 9 11 7 9 14     CAGE-AID:       10/4/2024     9:30 AM   CAGE-AID Total Score   Total Score 0   Total Score MyChart 0 (A total score of 2 or greater is considered clinically significant)     PROMIS 10-Global Health (only subscores and total score):       2/3/2025     4:17 PM   PROMIS-10 Scores Only   Global Mental Health Score 7    Global Physical Health Score 9     PROMIS TOTAL - SUBSCORES 16        Patient-reported     Sanders Suicide Severity Rating Scale (Short Version)      2/15/2023     8:59 AM 6/18/2024     9:22 AM 2/5/2025     2:00 PM   Sanders Suicide Severity Rating (Short Version)   Over the past 2 weeks have you felt down, depressed, or hopeless? no     Over the past 2 weeks have you had thoughts of killing yourself? no     Have you ever attempted to kill yourself? no     Q1 Wished to be Dead (Past Month)  0-->no    Q2 Suicidal Thoughts (Past Month)  0-->no    Q6 Suicide Behavior (Lifetime)  1-->yes    If yes to Q6, within past 3 months?  0-->no    Level of Risk per Screen  moderate risk    1. Wish to be Dead (Since Last Contact)   N   2. Non-Specific Active Suicidal Thoughts (Since Last Contact)   N   Actual Attempt (Since Last Contact)   N   Has subject engaged in non-suicidal self-injurious behavior? (Since Last Contact)   N   Interrupted Attempts (Since Last Contact)   N   Aborted or Self-Interrupted Attempt (Since Last Contact)   N   Preparatory Acts or Behavior (Since Last Contact)   N   Suicide (Since Last Contact)   N   Calculated C-SSRS Risk Score (Since Last Contact)   No Risk Indicated       Personal and Family Medical History:  Patient does report a family history of mental health concerns.  Patient reports family history includes Anesthesia Reaction in her maternal aunt; Asthma in her brother; Breast Cancer in her maternal grandmother; Cervical Cancer in her maternal aunt; Colon Cancer in her maternal grandmother; Colon Polyps in her maternal aunt; Diabetes in her maternal grandfather; Hypertension in her father; Prostate Cancer in her maternal grandfather; Thyroid Disease in her father and paternal grandmother..     Patient does report Mental Health Diagnosis and/or Treatment.  Patient Patient reported the following previous diagnoses which include(s):  MDD and anxiety .  Patient reported symptoms began years ago worsening alongside medical  "conditions.   Patient has received mental health services in the past:  individual therapy, medication management, IP .  Psychiatric Hospitalizations:  Most recent May 2021 [facility]\" .  Patient denies a history of civil commitment.  Patient is receiving other mental health services.  These include  medication management .       Patient has had a physical exam to rule out medical causes for current symptoms.  Date of last physical exam was within the past year. Client was encouraged to follow up with PCP if symptoms were to develop. The patient has a [facility] Primary Care Provider, who is named Teja Raman.  Patient reports the following current medical concerns: per EHR medical conditions .  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  Patient's pain provider is \"multiple different specialist to address areas of pain\".   There are not significant appetite / nutritional concerns / weight changes.   Patient does report a history of head injury / trauma / cognitive impairment.\"In 2013 I had a hematoma\", per pt report.    Patient reports current meds as:   Current Outpatient Medications   Medication Sig Dispense Refill    acetaminophen (TYLENOL) 500 MG tablet Take 500 mg by mouth daily as needed for mild pain Pt states taking 1-2 times/week.      amoxicillin (AMOXIL) 500 MG capsule Take 2,000 mg by mouth daily as needed Before dental work      aspirin-acetaminophen-caffeine (EXCEDRIN MIGRAINE) 250-250-65 MG tablet Take 1 tablet by mouth every 6 hours as needed for headaches      bisacodyl (DULCOLAX) 5 MG EC tablet Take 5 mg by mouth daily as needed for constipation      botulinum toxin type A (BOTOX) 100 units injection every 3 (three) months.      Calcium Citrate-Vitamin D (CALCIUM + D PO) Take 1 tablet by mouth every morning Calcium citrate 400 mg, vitamin D 500 international unit(s) (per 2 tabs): 1 tab daily      cetirizine (ZYRTEC) 10 MG tablet Take 10 mg by mouth every evening PM *NOTE: this is a " study drug      Cholecalciferol (VITAMIN D3) 75 MCG (3000 UT) TABS Take 1 tablet by mouth every morning      diphenhydrAMINE (BENADRYL) 25 MG capsule Take 50 mg by mouth every 4 hours.      escitalopram (LEXAPRO) 20 MG tablet TAKE 1 TABLET(20 MG) BY MOUTH AT BEDTIME 90 tablet 1    ferrous sulfate (FEROSUL) 325 (65 Fe) MG tablet Take 1 tablet (325 mg) by mouth every other day. 45 tablet 1    gabapentin (NEURONTIN) 600 MG tablet Take 1,200 mg by mouth 2 times daily AM and PM      hyoscyamine (LEVSIN) 0.125 MG tablet TAKE 1 TABLET(125 MCG) BY MOUTH DAILY IN THE MORNING 90 tablet 2    ibuprofen (ADVIL/MOTRIN) 200 MG tablet Take 400 mg by mouth daily as needed for mild pain      losartan (COZAAR) 25 MG tablet Take 25 mg by mouth 2 times daily      MAGNESIUM GLYCINATE PO Take 400 mg by mouth daily Evening dosing      methocarbamol (ROBAXIN) 750 MG tablet Take 750 mg by mouth.      methylphenidate HCl ER, OSM, (CONCERTA) 18 MG CR tablet Take 1 tablet (18 mg) by mouth daily. 30 tablet 0    [START ON 2/24/2025] methylphenidate HCl ER, OSM, (CONCERTA) 18 MG CR tablet Take 1 tablet (18 mg) by mouth daily. 30 tablet 0    methylphenidate HCl ER, OSM, (CONCERTA) 18 MG CR tablet Take 1 tablet (18 mg) by mouth every morning. 30 tablet 0    Multiple Vitamins-Minerals (DAILY MULTI VITAMIN/MINERALS PO) Take 1 split tablet by mouth every evening      OnabotulinumtoxinA (BOTOX IJ) Every 13 weeks      ondansetron (ZOFRAN) 8 MG tablet Take 8 mg by mouth every 8 hours as needed for nausea      oxyCODONE (ROXICODONE) 5 MG tablet Take 5 mg by mouth.      propranolol (INDERAL) 20 MG tablet Take 1 and 1/2 tablets by mouth every day as needed for palpitations      Riboflavin (VITAMIN B2 PO) Take 400 mg by mouth daily      rimegepant (NURTEC) 75 MG ODT tablet Take 75 mg by mouth daily as needed for migraine      senna-docusate (SENOKOT-S/PERICOLACE) 8.6-50 MG tablet Take 2 tablets by mouth daily At bedtime      tretinoin (RETIN-A) 0.025 %  external cream Apply topically at bedtime Prescribed by [facility]       No current facility-administered medications for this visit.       Medication Adherence:  Patient reports taking.    Patient Allergies:   PER EHR.  Allergies   Allergen Reactions    Adhesive Tape     Peanut (Diagnostic)      Not anaphylactic - worsens reflux    Tomato     Flagyl [Metronidazole]      palpitations and tachycardia from the tablet, gel was tolerated.    Liquid Adhesive Dermatitis       Medical History:  PER EHR  Past Medical History:   Diagnosis Date    Chondral defect of femoral condyle     Chronic pain     Constipation     Depressive disorder     Eosinophilic esophagitis     ANKIT (generalized anxiety disorder)     Gastroesophageal reflux disease with esophagitis     History of blood transfusion 3/10/21    Heart Surgery    Loeys-Milady syndrome     Migraine with aura     Osteoporosis     Pectus carinatum     Scoliosis     Splenic cyst     Status post cardiac surgery     s/p Mitral valve repair in 2016, valve sparing aortic root replacement, tricuspid valve repair 3/2021         Current Mental Status Exam:   Appearance:  Appropriate    Eye Contact:  Good   Psychomotor:  Normal       Gait / station:  no problem  Attitude / Demeanor: Cooperative   Speech      Rate / Production: Normal/ Responsive      Volume:  Normal  volume      Language:  intact  Mood:   Normal  Affect:   Appropriate    Thought Content: Clear   Thought Process: Coherent       Associations: No loosening of associations  Insight:   Good   Judgment:  Intact   Orientation:  All  Attention/concentration: Good    Substance Use:   Patient did not report a family history of substance use concerns; see medical history section for details.  Patient has not received chemical dependency treatment in the past.  Patient has not ever been to detox.      Patient is not currently receiving any chemical dependency treatment. Patient reported the following problems as a  "result of their substance use:   none reported in dropdown box upon pt inquiry .    Based on the CAGE score of 0 and clinical interview there  are not indications of drug or alcohol abuse at this point in time.    Significant Losses / Trauma / Abuse / Neglect Issues:   Patient did not  serve in the .  Pt reports there are not indications or report of significant loss, trauma, abuse or neglect issues related to: are no indications and client denies any losses, trauma, abuse, or neglect concerns.  Pt reports patient has not been a victim of exploitation.  Pt reports concerns for possible neglect are not present.     Safety Assessment:   Patient denies current or past homicidal ideation and behaviors.  Patient denies current or past suicidal ideation and behaviors.  Patient denies current or past self-injurious behaviors.  Patient denied risk behaviors associated with substance use.  Patient denies any high risk behaviors associated with mental health symptoms.  Patient denied current or past personal safety concerns.    Patient denies past of current/recent assaultive behaviors.    Patient denied a history of sexual assault behaviors.     Patient reports there are   firearms in the house, they are secured\".    Patient reports the following protective factors: hopeful, identifies reason for living, access to and engagement with healthcare, current engagement in treatment and/or motivation to establish therapeutic relationship, and lives in a responsibly safe environment forward or future oriented thinking; purpose; other    Risk Plan:  See Recommendations for Safety and Risk Management Plan    Review of Symptoms per patient report:   Depression: Lack of interest or pleasure in doing things, Feeling sad, down, or depressed, Change in sleep, Change in appetite, Irritability, Withdrawn, and Poor hygeine  Tara:  No Symptoms  Psychosis: No Symptoms  Anxiety: Excessive worry, Nervousness, Social anxiety, " Ruminations, and Irritability  Panic:  No symptoms  Post Traumatic Stress Disorder:  No Symptoms   Eating Disorder: No Symptoms  ADD / ADHD:  Inattentive  Conduct Disorder: No symptoms  Autism Spectrum Disorder: No symptoms  Obsessive Compulsive Disorder: No Symptoms    Patient reports the following compulsive behaviors and treatment history: None.      Diagnostic Criteria:   Major Depressive Disorder  CRITERIA (A-C) REPRESENT A MAJOR DEPRESSIVE EPISODE - SELECT THESE CRITERIA  A) Recurrent episode(s) - symptoms have been present during the same 2-week period and represent a change from previous functioning 5 or more symptoms (required for diagnosis)   - Depressed mood. Note: In children and adolescents, can be irritable mood.     - Diminished interest or pleasure in all, or almost all, activities.    - Significant weight /appetite changes.    - sleep changes/ sleep.    - Fatigue or loss of energy.    - Diminished ability to think or concentrate, or indecisiveness.     Generalized Anxiety Disorder  A. Excessive anxiety and worry about a number of events or activities (such as work or school performance).   B. The person finds it difficult to control the worry.  C. Select 3 or more symptoms (required for diagnosis). Only one item is required in children.   - Restlessness or feeling keyed up or on edge.    - Being easily fatigued.    - Difficulty concentrating or mind going blank.    - Irritability.     Functional Status:  Patient reports the following functional impairments:  organization, relationship(s), self-care, and social interactions.     Adult  Programmatic care:  Current LOCUS was assigned and patient needs the following level of care based on score 18.    Vulnerability:  Per pt report at the time of this assessment:  1. Does the patient have a history of vulnerability such as being teased, picked on, or other indications of potential safety issues with other residents?  No    2. Does this patient have a  history of being the victim of abuse? No history of abuse reported or documented.    3. Does this patient have a history of victimizing others? No     4. Does the patient have a history of boundary violations?  No.    5. Does the patient have a history of other sexual acting out behaviors (e.g grooming)?   No    6. Does the patient have a history of threats to self or others? Fire setting, running away or other self-injurious behaviors?    No    7. Does the patient s history indicate the need for special precautions or particular staffing patterns in the facility?  No      NOTE: If this screening indicates that the patient is at risk to harm self or others, notify staff at referral location.    LOCUS Worksheet     Name: Lily Albert MRN: [id number]    : 2005      Gender:  male        I. Risk of Harm:   3      Moderate Risk of Harm    II. Functional Status:   3      Moderate Impairment    III. Co-Morbidity:   3      Significant Co-Morbidity    IV - A. Recovery Environment - Level of Stress:   2      Mildly Stressful Environment    IV - B. Recovery Environment - Level of Support:   2      Supportive Environment    V. Treatment and Recovery History:   3      Moderate to Equivocal Response to Treatment and Recovery Management    VI. Engagement and Recovery Project:   2      Positive Engagement and Recovery       18 Composite Score    Level of Care Recommendation:   17 to 19       High Intensity Community Based Services      Clinical Summary:  1. Psychosocial Factors:  Medical complexities.  Cultural and Contextual Factors: mental health  2. Principal DSM5 Diagnoses  (Sustained by DSM5 Criteria Listed Above):   296.32 (F33.1) Major Depressive Disorder, Recurrent Episode, Moderate _ and With anxious distress.  3. Other Diagnoses that is relevant to services:   300.02 (F41.1) Generalized Anxiety Disorder.  4. Provisional Diagnosis:    5. Prognosis: Expect Improvement and Relieve Acute Symptoms.  6. Likely  consequences of symptoms if not treated: possible continuation of sxs.  7. Patient strengths include:  committed to sobriety, educated, good listener, has a previous history of therapy, insightful, intelligent, open to learning, open to suggestions / feedback, and support of family, friends and providers, hopeful, identifies reason for living, access to and engagement with healthcare, current engagement in treatment and/or motivation to establish therapeutic relationship, and lives in a responsibly safe environment forward or future oriented thinking; purpose;  .     Recommendations:     1. Plan for Safety and Risk Management:   Safety and Risk: Recommended that patient call 911 or go to the local ED should there be a change in any of these risk factors      2. Patient's identified jp / Sabianist / spiritual influences will be incorporated into care by pts personal preferences .     3. Initial Treatment will focus on:    Depressed Mood - anxiety .     4. Resources/Service Plan:    services are not indicated.   Modifications to assist communication are not indicated.   Additional disability accommodations are not indicated.      5. Collaboration:   Collaboration / coordination of treatment will be initiated with the following  support professionals:  Brien Patient Navigation Coordinator handoff completed today .      6.  Referrals:   The following referral(s) will be initiated: IOP.       A Release of Information has been obtained for the following:  N/A .     Clinical Substantiation/medical necessity for the above recommendations:  Pt is a 19 year old who is seen for a diagnostic assessment for programmatic care.  Pt is referred by [facility] ED/ care team . Pt reported an increase in mental health sxs, stressors, and functional impairment.  Pt recently seen in ED for mental health sxs around 1/29/25.  The patient endorses significant functional impairment in variety of areas, including home life  with , organization, relationship(s), self-care, social interactions, and work / vocational responsibilities.  Current LOCUS was assigned and indicates the following level of care based on score 18 =IOP.  The pt endorses the following strengths, caring, committed to sobriety, good listener, has a previous history of therapy, motivated, open to learning, open to suggestions / feedback, support of family, friends and providers, supportive, wants to learn, willing to ask questions, willing to relate to others, and work history. The patient appears appropriate for programmatic care based on history, clinical interview and LOCUS score.  Pt may benefit from IOP to learn and build new skills to manage stress, gain insight, and learn new ways to respond differently.     7. ROBIN:    ROBIN:  Discussed the general effects of drugs and alcohol on health and well-being.     8. [place] Records:   These were reviewed at time of assessment.   Information in this assessment was obtained from the medical record and  provided by patient who is a good historian.    Patient will have open access to their mental health medical record.    9.   Interactive Complexity: No    10. Safety Plan: Reviewed safety plan with pt, pt agrees to follow, safety plan in this note and in pts mychart.  If unable to follow safety plan call 911.        [facility] Safety Plan       Step 1: Warning signs:    Warning Signs    pain    physical health      Step 2: Internal coping strategies - Things I can do to take my mind off my problems without contacting another person:    Strategies    distraction      Step 3: People and social settings that provide distraction:    Name Contact Information    My mom - number is in phone        Places    out and game with friends at stores      Step 4: People whom I can ask for help during a crisis:    Name Contact Information    my mom     [facility] [phone]     911     emergency dept       Step 5:  Professionals or agencies I can contact during a crisis:    Clinician/Agency Name Phone Emergency Contact    [facility] [phone]      910      emergency dept        Local Emergency Department Emergency Department Address Emergency Department Phone    911        Suicide Prevention Lifeline Phone: Call or Text 643  Crisis Text Line: Text HOME to 112025     Step 6: Making the environment safer (plan for lethal means safety):      Optional: What is most important to me and worth living for?:      [place] Safety Plan. Clara Vu and Junaid Arriaga. Used with permission of the authors.           Provider Name/ Credentials:  Brooke GUERRA Psychotherapist Trainee  February 5, 2025

## 2025-02-05 NOTE — TELEPHONE ENCOUNTER
Summary of Patient Care Communication Handoff to Patient Navigator Coordinator    PATIENT'S NAME: Lily Albert  MRN:   [id number]  :   2005    DATE OF SERVICE: 25    Referral Needed: Yes    Is the patient coming from an inpatient unit?  No- however recent ED visit for MH refer for IOP, per chart review.    What program is this referral for? Adult Mental Health Referral     Level of Care Recommended:  IOP     Follow up Requests:  Referral to designated [facility].     Comments: Pt requests navigator team follow up call.              Patient Navigator Coordinator Contact Information  Pool Message: [email or web address] ([phone])  Phone:  [phone]  Fax:  [phone]  Email:  [email or web address]

## 2025-02-06 ENCOUNTER — TELEPHONE (OUTPATIENT)
Dept: BEHAVIORAL HEALTH | Facility: CLINIC | Age: 20
End: 2025-02-06
Payer: COMMERCIAL

## 2025-02-06 NOTE — TELEPHONE ENCOUNTER
----- Message from Amarilys THOMAS sent at 2/6/2025  9:49 AM CST -----  Regarding: Referral for IOP-2; mornings  Adult Mental Health Programmatic Care Schedule Request    Patient Name: Lily Albert  Location of programming: [facility]  Start Date: 2/10/2025    Adult Program Group: Adult Program Group: IOP 2 General Mood Track [[id number]]  Schedule:  M, T, Th, F 9am- 12noon  12 hours per week for 9 weeks  Number of visits to be scheduled: 36 days    Attending Provider (MD):  Dr. Castro Beavers ([place])  Visit Type:  In-Person    Accommodations Needed: No  Alerts Identified/Substantiation: No  Consulted with Supervisor: No

## 2025-02-06 NOTE — TELEPHONE ENCOUNTER
**Patient Navigator Follow Up**    2/6/2025;    Referral for IOP  Is the patient coming from an inpatient unit?  No- however recent ED visit for MH refer for IOP, per chart review.    Comments: Pt requests navigator team follow up call.        I called and spoke to patient about programming.  She prefers mornings and is going to start in IOP-2 mornings on Monday, 2/10.    I added her to the census and sent in basket message to the program as well.    I sent her program welcome letter through [employer].        Thank you,    Amarilys GUERRA  Patient Navigator

## 2025-02-10 ENCOUNTER — HOSPITAL ENCOUNTER (OUTPATIENT)
Dept: BEHAVIORAL HEALTH | Facility: CLINIC | Age: 20
End: 2025-02-10
Attending: PSYCHIATRY & NEUROLOGY
Payer: COMMERCIAL

## 2025-02-10 PROBLEM — F33.1 MDD (MAJOR DEPRESSIVE DISORDER), RECURRENT EPISODE, MODERATE (H): Status: ACTIVE | Noted: 2025-02-10

## 2025-02-10 PROCEDURE — 90853 GROUP PSYCHOTHERAPY: CPT

## 2025-02-10 ASSESSMENT — PATIENT HEALTH QUESTIONNAIRE - PHQ9: SUM OF ALL RESPONSES TO PHQ QUESTIONS 1-9: 13

## 2025-02-10 NOTE — GROUP NOTE
Psychotherapy Group Note    PATIENT'S NAME: Lily Albert  MRN:   [id number]  :   2005  ACCT. NUMBER: [id number]  DATE OF SERVICE: 2/10/25  START TIME: 10:00 AM  END TIME: 10:50 AM  FACILITATOR: Catina Reyes LICSW  TOPIC:  EBP Group: Specialty Awareness  [facility] Adult Mental Health Outpatient Programs  TRACK: IOP 2     NUMBER OF PARTICIPANTS: 7    Summary of Group / Topics Discussed:  Specialty Topics: Grief/Transitions: Patients received an overview of the grief process.  Patients explored their relationship to loss and how that has affected their mental health symptoms.  Strategies for recognizing loss and ideas for engaging in the grief process was presented and discussed.  Patients identified needs for support and coping skills to manage loss.  The purpose of this specialty topic is to help patients identify the aspects of change due to loss that individuals experience in addition to mental health symptoms to better cope with the grief, loss, and life transitions.      Patient Session Goals / Objectives:  Identified grief, loss, and life transitions   Discussed how grief impacts mental health symptoms and disrupts usual functioning  Identified needs for support and coping with grief and planned further action for coping      Patient Participation / Response:  Fully participated with the group by sharing personal reflections / insights and openly received / provided feedback with other participants.    Demonstrated understanding of topics discussed through group discussion and participation, Identified / Expressed readiness to act on skill suggestions discussed in topic, and Verbalized understanding of ways to proactively manage illness    Treatment Plan:  Patient has a current master individualized treatment plan.  See Epic treatment plan for more information.    HERMINIA Chen

## 2025-02-10 NOTE — GROUP NOTE
Process Group Note    PATIENT'S NAME: Lily Albert  MRN:   [id number]  :   2005  ACCT. NUMBER: [id number]  DATE OF SERVICE: 2/10/25  START TIME:  9:00 AM  END TIME:  9:50 AM  FACILITATOR: Catina Reyes LICSW  TOPIC:  Process Group    Diagnoses:    296.32 (F33.1) Major Depressive Disorder, Recurrent Episode, Moderate _ and With anxious distress.  3. Other Diagnoses that is relevant to services:   300.02 (F41.1) Generalized Anxiety Disorder.    [facility] Adult Mental Health Outpatient Programs  TRACK: IOP 2     NUMBER OF PARTICIPANTS: 8        Data:    Session content: At the start of this group, patients were invited to check in by identifying themselves, describing their current emotional status, and identifying issues to address in this group.   Area(s) of treatment focus addressed in this session included Symptom Management and Personal Safety.    Patient reported feeling anxious. Patient discussed working toward emotional wellness. Patient identified emotional regulation as skills they will use to address their goal(s). Patient reported physical concerns may be a barrier to working toward their goal(s) and/or addressing mental health symptoms. Patient reported no safety concerns and/or self-injurious behaviors. Patient reported no substance use. Patient reported they are taking their medications as prescribed. Patient reported feeling proud that they part of group. Patient discussed traumas with the treatment group.              Therapeutic Interventions/Treatment Strategies:  Treatment modalities used include Cognitive Behavioral Therapy and Dialectical Behavioral Therapy.    Assessment:    Patient response:   Patient responded to session by accepting feedback, giving feedback, listening, and being attentive    Possible barriers to participation / learning include: and no barriers identified    Health Issues:   None reported       Substance Use Review:   Substance Use: No active concerns  identified.    Mental Status/Behavioral Observations  Appearance:   Appropriate   Eye Contact:   Good   Psychomotor Behavior: Normal   Attitude:   Cooperative   Orientation:   All  Speech   Rate / Production: Normal    Volume:  Normal   Mood:    Normal  Affect:    Appropriate   Thought Content:   Clear  Thought Form:  Coherent  Logical     Insight:    Good     Plan:   Safety Plan: No current safety concerns identified.  Recommended that patient call 911 or go to the local ED should there be a change in any of these risk factors.   Barriers to treatment: None identified  Patient Contracts (see media tab):  None  Substance Use: Not addressed in session   Continue or Discharge: Patient will continue in Adult Day Treatment (ADT)  as planned. Patient is likely to benefit from learning and using skills as they work toward the goals identified in their treatment plan.      Catina Reyes, [facility]  February 10, 2025

## 2025-02-10 NOTE — GROUP NOTE
Psychotherapy Group Note    PATIENT'S NAME: Lily Alebrt  MRN:   [id number]  :   2005  ACCT. NUMBER: [id number]  DATE OF SERVICE: 2/10/25  START TIME: 11:00 AM  END TIME: 11:50 AM  FACILITATOR: Emiliana Liao LICSW  TOPIC: MH EBP Group: Coping Skills  [facility] Adult Mental Health Outpatient Programs  TRACK: IOP 2    NUMBER OF PARTICIPANTS: 8    Summary of Group / Topics Discussed:  Coping Skills: Additional Coping Skills:  Patients discussed and practiced LOOKING BACK, a way to focus on positives and accomplishments from the recent past.  Reviewed the benefits of applying the aforementioned coping strategies.  Patients explored how these strategies might be applied to daily stressors or distressing situations.    Patient Session Goals / Objectives:  Understand the purpose and benefits of applying LOOKING BACK coping strategies  Identify accomplishments  Identify areas of gratitude  Address barriers to utilizing coping skills when in distress.      Patient Participation / Response:  Fully participated with the group by sharing personal reflections / insights and openly received / provided feedback with other participants.    Demonstrated understanding of topics discussed through group discussion and participation and Expressed understanding of the relevance / importance of coping skills at distressing times in life    Treatment Plan:  Patient has a current master individualized treatment plan.  See Epic treatment plan for more information.    HERMINIA Strong

## 2025-02-11 ENCOUNTER — HOSPITAL ENCOUNTER (OUTPATIENT)
Dept: BEHAVIORAL HEALTH | Facility: CLINIC | Age: 20
End: 2025-02-11
Attending: PSYCHIATRY & NEUROLOGY
Payer: COMMERCIAL

## 2025-02-11 ENCOUNTER — OFFICE VISIT (OUTPATIENT)
Dept: FAMILY MEDICINE | Facility: CLINIC | Age: 20
End: 2025-02-11
Payer: COMMERCIAL

## 2025-02-11 VITALS
RESPIRATION RATE: 24 BRPM | HEART RATE: 97 BPM | HEIGHT: 70 IN | TEMPERATURE: 97.3 F | OXYGEN SATURATION: 98 % | DIASTOLIC BLOOD PRESSURE: 68 MMHG | WEIGHT: 154.6 LBS | BODY MASS INDEX: 22.13 KG/M2 | SYSTOLIC BLOOD PRESSURE: 98 MMHG

## 2025-02-11 VITALS — DIASTOLIC BLOOD PRESSURE: 76 MMHG | SYSTOLIC BLOOD PRESSURE: 107 MMHG | HEART RATE: 87 BPM | OXYGEN SATURATION: 99 %

## 2025-02-11 DIAGNOSIS — Q67.5 CONGENITAL DEFORMITY OF SPINE: ICD-10-CM

## 2025-02-11 DIAGNOSIS — G89.29 CHRONIC BILATERAL LOW BACK PAIN WITHOUT SCIATICA: ICD-10-CM

## 2025-02-11 DIAGNOSIS — M81.8 OTHER OSTEOPOROSIS, UNSPECIFIED PATHOLOGICAL FRACTURE PRESENCE: ICD-10-CM

## 2025-02-11 DIAGNOSIS — F33.1 MDD (MAJOR DEPRESSIVE DISORDER), RECURRENT EPISODE, MODERATE (H): Primary | ICD-10-CM

## 2025-02-11 DIAGNOSIS — M54.50 CHRONIC BILATERAL LOW BACK PAIN WITHOUT SCIATICA: ICD-10-CM

## 2025-02-11 DIAGNOSIS — Q87.89 LOEYS-DIETZ SYNDROME: Primary | ICD-10-CM

## 2025-02-11 PROCEDURE — 99214 OFFICE O/P EST MOD 30 MIN: CPT | Performed by: PEDIATRICS

## 2025-02-11 PROCEDURE — 90853 GROUP PSYCHOTHERAPY: CPT

## 2025-02-11 RX ORDER — TERCONAZOLE 4 MG/G
CREAM VAGINAL
COMMUNITY
Start: 2024-10-18

## 2025-02-11 ASSESSMENT — ANXIETY QUESTIONNAIRES
IF YOU CHECKED OFF ANY PROBLEMS ON THIS QUESTIONNAIRE, HOW DIFFICULT HAVE THESE PROBLEMS MADE IT FOR YOU TO DO YOUR WORK, TAKE CARE OF THINGS AT HOME, OR GET ALONG WITH OTHER PEOPLE: SOMEWHAT DIFFICULT
7. FEELING AFRAID AS IF SOMETHING AWFUL MIGHT HAPPEN: SEVERAL DAYS
GAD7 TOTAL SCORE: 12
5. BEING SO RESTLESS THAT IT IS HARD TO SIT STILL: MORE THAN HALF THE DAYS
3. WORRYING TOO MUCH ABOUT DIFFERENT THINGS: MORE THAN HALF THE DAYS
6. BECOMING EASILY ANNOYED OR IRRITABLE: NEARLY EVERY DAY
1. FEELING NERVOUS, ANXIOUS, OR ON EDGE: SEVERAL DAYS
GAD7 TOTAL SCORE: 12
4. TROUBLE RELAXING: MORE THAN HALF THE DAYS
2. NOT BEING ABLE TO STOP OR CONTROL WORRYING: SEVERAL DAYS

## 2025-02-11 ASSESSMENT — COLUMBIA-SUICIDE SEVERITY RATING SCALE - C-SSRS
1. SINCE LAST CONTACT, HAVE YOU WISHED YOU WERE DEAD OR WISHED YOU COULD GO TO SLEEP AND NOT WAKE UP?: YES
2. HAVE YOU ACTUALLY HAD ANY THOUGHTS OF KILLING YOURSELF?: NO

## 2025-02-11 ASSESSMENT — PAIN SCALES - GENERAL: PAINLEVEL_OUTOF10: MILD PAIN (2)

## 2025-02-11 NOTE — ADDENDUM NOTE
Encounter addended by: Jorge Luis Rodriguez on: 2/11/2025 12:48 PM   Actions taken: Flowsheet accepted
no

## 2025-02-11 NOTE — PROGRESS NOTES
Assessment & Plan     Loeys-Milady syndrome      Chronic bilateral low back pain without sciatica      Congenital deformity of spine      Other osteoporosis, unspecified pathological fracture presence        Plan:      Letter provided outlining functional limitations that were discussed with patient and mother today.  Will have them follow up with PM&R for any updated letters as they may have other ideas to include.      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  She is not taking prescribed medications regularly due to remembering to take.         Here today with mom for letter need.      Since our last visit patient has been seen by PM & R at [facility]. 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 Patient needs a letter for the school outlining functional limitations even with use of the wheelchair- things she should be limited for when in the chair vs out of the chair. Will follow up with PM&R again in about 6 months.     Also since our last visit she is participating in an outpatient partial hospitalization with psychiatry.  She is staying with Grandparents in [place] and attending in [place] several days per week.      Continues to have debilitating back pain.     Is working with endocrinology to decide if she needs to be treated with PTH or bisphosphonate due to osteoporosis.           Objective    BP 98/68   Pulse 97   Temp  "97.3  F (36.3  C)   Resp 24   Ht 1.772 m (5' 9.75\")   Wt 70.1 kg (154 lb 9.6 oz)   SpO2 98%   BMI 22.34 kg/m    Body mass index is 22.34 kg/m .    Physical Exam     General:  Fair eye contact, appropriate mood. Mother supportive. Tearful.            Signed Electronically by: Billie Raman MD    "

## 2025-02-11 NOTE — GROUP NOTE
Psychotherapy Group Note    PATIENT'S NAME: Lily Albert  MRN:   [id number]  :   2005  ACCT. NUMBER: [id number]  DATE OF SERVICE: 25  START TIME: 11:00 AM  END TIME: 11:50 AM  FACILITATOR: Emiliana Liao LICSW  TOPIC: MH EBP Group: Coping Skills  [facility] Adult Mental Health Outpatient Programs  TRACK: IOP 2    NUMBER OF PARTICIPANTS: 7    Summary of Group / Topics Discussed:  Coping Skills: Additional Coping Skills:  Patients discussed and practiced LOOKING FORWARD, an exercise to improve optimism and motivation for change.  Reviewed the benefits of applying the aforementioned coping strategies.  Patients explored how these strategies might be applied to daily stressors or distressing situations.    Patient Session Goals / Objectives:  Understand the purpose and benefits of applying LOOKING FORWARD coping strategies  Identify goals for the future  Identify things to look forward to  Address barriers to utilizing coping skills when in distress.      Patient Participation / Response:  Fully participated with the group by sharing personal reflections / insights and openly received / provided feedback with other participants.    Demonstrated understanding of topics discussed through group discussion and participation, Expressed understanding of the relevance / importance of coping skills at distressing times in life, and Demonstrated knowledge of when to consider using a variety of coping skills in daily life    Treatment Plan:  Patient has a current master individualized treatment plan.  See Epic treatment plan for more information.    HERMINIA Strong

## 2025-02-11 NOTE — LETTER
2025    Lily Albert   2005        To Whom it May Concern;    Lily Albert is under my care for Loeys-Milady syndrome.  Due to this underlying disorder, she has been evaluated by Physical Medicine and Rehab colleagues who have recommended intermittent use of a wheelchair to improve her overall function ability to participate in a nursing program.      Functional limitations when Lily is out of her chair include:   15 lb lifting limit  Mindfulness of joint instability    Functional limitations when Lily is in her chair include:   Limits on how high she can physically reach  Inability to participate in any lifting activity    General functional limitations Lily encounters due to her Loeys-Milady syndrome include:   Slower physical response time  Lower endurance/quick to fatigue        Sincerely,        Billie Raman MD

## 2025-02-11 NOTE — GROUP NOTE
Process Group Note    PATIENT'S NAME: Lily Albert  MRN:   [id number]  :   2005  ACCT. NUMBER: [id number]  DATE OF SERVICE: 25  START TIME:  9:00 AM  END TIME:  9:50 AM  FACILITATOR: Catina Reyes LICSW  TOPIC:  Process Group    Diagnoses:      296.32 (F33.1) Major Depressive Disorder, Recurrent Episode, Moderate _ and With anxious distress.  3. Other Diagnoses that is relevant to services:   300.02 (F41.1) Generalized Anxiety Disorder.    [facility] Adult Mental Health Outpatient Programs  TRACK: IOP 2     NUMBER OF PARTICIPANTS: 7        Data:    Session content: At the start of this group, patients were invited to check in by identifying themselves, describing their current emotional status, and identifying issues to address in this group.   Area(s) of treatment focus addressed in this session included Symptom Management and Personal Safety.    Patient reported feeling content. Patient discussed working toward anxiety reduction. Patient identified distress tolerance as skills they will use to address their goal(s). Patient reported self may be a barrier to working toward their goal(s) and/or addressing mental health symptoms. Patient reported no safety concerns and/or self-injurious behaviors. Patient reported no substance use. Patient reported they are taking their medications as prescribed. Patient reported feeling proud that they part of group. Patient discussed self love with the treatment group.              Therapeutic Interventions/Treatment Strategies:  Treatment modalities used include Cognitive Behavioral Therapy and Dialectical Behavioral Therapy.    Assessment:    Patient response:   Patient responded to session by accepting feedback, giving feedback, listening, and focusing on goals    Possible barriers to participation / learning include: and no barriers identified    Health Issues:   None reported       Substance Use Review:   Substance Use: No active concerns  identified.    Mental Status/Behavioral Observations  Appearance:   Appropriate   Eye Contact:   Good   Psychomotor Behavior: Normal   Attitude:   Cooperative   Orientation:   All  Speech   Rate / Production: Normal    Volume:  Normal   Mood:    Normal  Affect:    Appropriate   Thought Content:   Clear  Thought Form:  Coherent  Logical     Insight:    Good     Plan:   Safety Plan: No current safety concerns identified.  Recommended that patient call 911 or go to the local ED should there be a change in any of these risk factors.   Barriers to treatment: None identified  Patient Contracts (see media tab):  None  Substance Use: Not addressed in session   Continue or Discharge: Patient will continue in Adult Day Treatment (ADT)  as planned. Patient is likely to benefit from learning and using skills as they work toward the goals identified in their treatment plan.      Catnia Reyes, [facility]  February 11, 2025

## 2025-02-11 NOTE — GROUP NOTE
Psychotherapy Group Note    PATIENT'S NAME: Lily Albert  MRN:   [id number]  :   2005  ACCT. NUMBER: [id number]  DATE OF SERVICE: 25  START TIME: 10:00 AM  END TIME: 10:50 AM  FACILITATOR: Catina Reyes LICSW  TOPIC: MH EBP Group: Cognitive Restructuring  [facility] Mental Health Outpatient Programs  TRACK: IOP 2     NUMBER OF PARTICIPANTS: 7    Summary of Group / Topics Discussed:  Cognitive Restructuring: Distortions: Patients received an overview of how to identify common cognitive distortions. Patients will explore alternatives to cognitive distortions and practice challenging their negative thought patterns. The goal is to help patients target modify ineffective thought patterns.     Patient Session Goals / Objectives:  Familiarized self with ineffective / unhealthy thoughts and how they develop.    Explored impact of ineffective thoughts / distortions on mood and activity  Formulated new neutral/positive alternatives to challenge less helpful / ineffective thoughts.  Practiced and plan to apply in daily life             Patient Participation / Response:  Fully participated with the group by sharing personal reflections / insights and openly received / provided feedback with other participants.    Demonstrated understanding of topics discussed through group discussion and participation, Expressed understanding of the relationship between behaviors, thoughts, and feelings, and Demonstrated knowledge of personal thought patterns and how they impact their mood and behavior.    Treatment Plan:  Patient has a current master individualized treatment plan.  See Epic treatment plan for more information.    HERMINIA Chen

## 2025-02-11 NOTE — H&P
"[facility]   Adult Mental Health Outpatient Programs  Initial Psychiatric Evaluation    Patient: Lily Albert  Preferred Name: Lily  MRN: [id number]  : 2005  Acct. No.: [id number]  Date of Service: 25  Program Track: [facility] [place]    Date of Diagnostic Assessment/Psychosocial Evaluation:     Identifying Data:  Lily Albert, a 19 year old-year-old with reported history of ADHD; an anxiety disorder; depression, presents for initial visit to provide oversight of programmatic care. Patient attended the session alone, uses she/her pronouns, and prefers to be called: \"Lily\"    Presenting Concern:  \"Depression.\"   Per diagnostic assessment: \"Mental Health\"    History of Present Illness:  Chart reviewed, history as documented reviewed with Lily. Patient endorses:  History depression and anxiety. I was diagnosed with ADHD a couple of years ago  Has been hospitalized once for mental health   I had suicide ideation. Just thoughts  My physical health has been declining over the few years and my mental health has been going with it  I have been struggling with General depression and hopelessness  It has been gradual over time  Denies suicidal thought today  No recent medication charges due to current episode of Depression  When big things come like surgery I have fabricio attacks  I am just really sad because of the things I cannot do because of physical health  No history of physical, emotional and sexual trauma  No history hallucinations, delusion, paranoia, and rogelio  I do not have a psychiatrist currently  I am working on getting one, long wait list  Escitalopram 20 mg daily  Been taking for about 3 years  Concerta 18 mg daily  Been taking for about 18 months  Gabapentin 600 mg twice daily   Propranolol 10 mg twice daily  For tachycardia    Goals for Treatment (also please see individualized treatment plan):  Being happy despite these " issues    Review of Symptoms  Review of systems as recorded in diagnostic assessment reviewed with patient.  Today notes:  Sleep: Good. I am working with a therapist sleep specialist  I did sleep study, Restless leg syndrome  Iron has been helpful. Sleep is getting better  Appetite: Always fluctuate. I am eating less lately because of lack of appetite  Depression  Sad and angry about my body because if the thing I cannot or will never do  Angry at the doctor that cannot do anything,  Hopelessness and helplessness  Low self esteem  Suicidal ideation: denies current or recent suicidal ideation or behavior  I want to live but not just like this  Thoughts of non-suicidal self-injury: denied  Recent self-injurious behavior: denied  Homicidal ideation: denied  Other safety concerns: denied    Activities of Daily Living and Related Systems Impacted by Illness:  Hygiene: showering less often, once every week. I am working on one thing at the time, tight now working on remembering to brush my teeth  Socialization: isolating, I am not eating with my family at dinner table. I am not making the effort to go out and see people  Activities of Daily Living: (cleaning, shopping, bills, etc.): decreasing mostly due to pain  Concerns related to work: no    Substance use:  Denies    Current Medications:  Current Outpatient Medications   Medication Sig Dispense Refill    acetaminophen (TYLENOL) 500 MG tablet Take 500 mg by mouth daily as needed for mild pain Pt states taking 1-2 times/week.      amoxicillin (AMOXIL) 500 MG capsule Take 2,000 mg by mouth daily as needed Before dental work      aspirin-acetaminophen-caffeine (EXCEDRIN MIGRAINE) 250-250-65 MG tablet Take 1 tablet by mouth every 6 hours as needed for headaches      bisacodyl (DULCOLAX) 5 MG EC tablet Take 5 mg by mouth daily as needed for constipation      botulinum toxin type A (BOTOX) 100 units injection every 3 (three) months.      Calcium Citrate-Vitamin D (CALCIUM + D  PO) Take 1 tablet by mouth every morning Calcium citrate 400 mg, vitamin D 500 international unit(s) (per 2 tabs): 1 tab daily      cetirizine (ZYRTEC) 10 MG tablet Take 10 mg by mouth every evening PM *NOTE: this is a study drug      Cholecalciferol (VITAMIN D3) 75 MCG (3000 UT) TABS Take 1 tablet by mouth every morning      diphenhydrAMINE (BENADRYL) 25 MG capsule Take 50 mg by mouth as needed for sleep.      escitalopram (LEXAPRO) 20 MG tablet TAKE 1 TABLET(20 MG) BY MOUTH AT BEDTIME 90 tablet 1    ferrous sulfate (FEROSUL) 325 (65 Fe) MG tablet Take 1 tablet (325 mg) by mouth every other day. 45 tablet 1    gabapentin (NEURONTIN) 600 MG tablet Take 1,200 mg by mouth 2 times daily AM and PM      hyoscyamine (LEVSIN) 0.125 MG tablet TAKE 1 TABLET(125 MCG) BY MOUTH DAILY IN THE MORNING 90 tablet 2    ibuprofen (ADVIL/MOTRIN) 200 MG tablet Take 400 mg by mouth daily as needed for mild pain      losartan (COZAAR) 25 MG tablet Take 25 mg by mouth 2 times daily      MAGNESIUM GLYCINATE PO Take 400 mg by mouth daily Evening dosing      methylphenidate HCl ER, OSM, (CONCERTA) 18 MG CR tablet Take 1 tablet (18 mg) by mouth daily. 30 tablet 0    Multiple Vitamins-Minerals (DAILY MULTI VITAMIN/MINERALS PO) Take 1 split tablet by mouth every evening      ondansetron (ZOFRAN) 8 MG tablet Take 8 mg by mouth every 8 hours as needed for nausea      propranolol (INDERAL) 20 MG tablet Take 1 and 1/2 tablets by mouth every day as needed for palpitations      Riboflavin (VITAMIN B2 PO) Take 400 mg by mouth daily      rimegepant (NURTEC) 75 MG ODT tablet Take 75 mg by mouth daily as needed for migraine      senna-docusate (SENOKOT-S/PERICOLACE) 8.6-50 MG tablet Take 2 tablets by mouth daily At bedtime      tretinoin (RETIN-A) 0.025 % external cream Apply topically at bedtime Prescribed by [facility]         The above list was reviewed and updated in [facility] with patient today.     Patient is taking medications as prescribed  "and denies adverse effects    Medication History/Past Medication Trials:  Sertraline, stopped 3 years ago    Remainder of history, including psychiatric, substance, family, social as documented in diagnostic assessment/psychosocial evaluation and/or above    Medical Review of Systems:  Pertinent: None    Laboratory Results:  Most recent labs reviewed. Pertinent updates/findings: None.       Mental Status Examination:  Vital Signs: /76   Pulse 87   SpO2 99%    Appearance: adequately groomed, appears stated age, and in no apparent distress.  Attitude: cooperative   Eye Contact: good   Muscle Strength and Tone: normal  Psychomotor Behavior: normal or unremarkable   Gait and Station: normal width, turn, arm swing  Speech: clear, coherent, decreased prosody, regular rate, regular rhythm, and fluent  Associations: No loosening of associations  Thought Process: coherent and goal directed  Thought Content: no evidence of suicidal ideation or homicidal ideation, no evidence of psychotic thought, no auditory hallucinations present, and no visual hallucinations present  Mood: \"anxious and depressed\"  Affect: mood congruent  Insight: good  Judgment: intact, adequate for safety  Impulse Control: intact  Oriented to: time, place, person, and situation  Attention Span and Concentration: Normal  Language: Intact  Recent and Remote Memory: Delayed & immediate recall intact  Fund of Knowledge/Assessment of Intelligence: Average  Capacity of Activities of Daily Living: Independent, able to participate in programmatic care services.    DSM5 Diagnosis/es:    ICD-10-CM    1. MDD (major depressive disorder), recurrent episode, moderate (H)  F33.1       Other Diagnoses that is relevant to services:    300.02 (F41.1) Generalized Anxiety Disorder.     Assessment/Plan:  Lily presents today for initial psychiatric evaluation as part of oversight of programmatic care. She comes with a history of depression, anxiety, and ADHD, with " one prior hospitalization for suicidal ideation (passive thoughts).   Overtime, anhedonia and hopelessness have worsened, correlating with a decline in physical health. Significant distress arises from physical limitations caused by physical health.Currently experiencing deep sadness, anger, low self-esteem, and feelings of helplessness. Denies recent suicidal ideation or behaviors.      Current medications include Escitalopram, Concerta, Gabapentin, and Propranolol. Self identified goal is to find happiness despite ongoing challenges. She will engage in psychotherapy to develop coping skills, peer support, and prevent symptom escalation, hospitalization, and safety concerns.   Follow up in 3 weeks or sooner as needed    Safety Assessment:  Lily reports suicidal ideation and/or non-suicidal self-injury or thoughts thereof as noted above  Lily is future-oriented and is engaged in treatment planning   I do not feel that Lily meets criteria for a 72-hour involuntary hold and remains appropriate for an outpatient level of care      Signed:   HERNANDEZ ARCE, CARINE   February 11, 2025      Visit Details:  Type of service: In-person  Location (patient and provider): [facility] Adult Mental Health Outpatient Programmatic Care Offices    Level of Medical Decision Making:   - At least 1 chronic problem that is not stable  - Engaged in prescription drug management during visit (discussed any medication benefits, side effects, alternatives, etc.)  Discussion of management or test interpretation with external physician/other qualified healthcare professional/appropriate source - programmatic care multidisciplinary treatment team    Chart review as part of intake process includes diagnostic assessment/psychosocial evaluation and any recent updates, as well as recent ED and/or inpatient documentation, where applicable.The reader is referred to those sources for additional information.    60 min spent on the  date of the encounter in chart review, patient visit, review of tests, documentation, care coordination, and/or discussion with other providers about the issues documented above.      This document completed in part using [employer] dictation software and therefore may contain inadvertent word or phrase substitutions.

## 2025-02-13 ENCOUNTER — HOSPITAL ENCOUNTER (OUTPATIENT)
Dept: BEHAVIORAL HEALTH | Facility: CLINIC | Age: 20
End: 2025-02-13
Attending: PSYCHIATRY & NEUROLOGY
Payer: COMMERCIAL

## 2025-02-13 PROCEDURE — 90853 GROUP PSYCHOTHERAPY: CPT

## 2025-02-13 PROCEDURE — 90853 GROUP PSYCHOTHERAPY: CPT | Performed by: SOCIAL WORKER

## 2025-02-13 NOTE — GROUP NOTE
Psychotherapy Group Note    PATIENT'S NAME: Lily Albert  MRN:   [id number]  :   2005  ACCT. NUMBER: [id number]  DATE OF SERVICE: 25  START TIME: 11:00 AM  END TIME: 11:50 AM  FACILITATOR: Emiliana Liao LICSW  TOPIC: MH EBP Group: Coping Skills  [facility] Adult Mental Health Outpatient Programs  TRACK: IOP 2    NUMBER OF PARTICIPANTS: 8    Summary of Group / Topics Discussed:  Coping Skills: Additional Coping Skills:  Patients learned the definition and ways to improve  of self-esteem.  Reviewed the benefits of applying the aforementioned coping strategies.  Patients explored how these strategies might be applied to daily stressors or distressing situations.    Patient Session Goals / Objectives:  Understand the purpose and benefits of applying self-esteem coping strategies  Identify ways to improve self-esteem  Address barriers to utilizing coping skills when in distress.      Patient Participation / Response:  Fully participated with the group by sharing personal reflections / insights and openly received / provided feedback with other participants.    Demonstrated understanding of topics discussed through group discussion and participation and Expressed understanding of the relevance / importance of coping skills at distressing times in life    Treatment Plan:  Patient has a current master individualized treatment plan.  See Epic treatment plan for more information.    HERMINIA Strong

## 2025-02-13 NOTE — GROUP NOTE
Psychoeducation Group Note    PATIENT'S NAME: Lily Albert  MRN:   [id number]  :   2005  ACCT. NUMBER: [id number]  DATE OF SERVICE: 25  START TIME: 10:00 AM  END TIME: 10:50 AM  FACILITATOR: Shanea Jones LICSW  TOPIC: MH Wellness Group: Health Maintenance  [facility] Mental Health Outpatient Programs  TRACK: IOP 2    NUMBER OF PARTICIPANTS: 8    Summary of Group / Topics Discussed:  Health Maintenance: Eight Dimensions of Wellness: The concept of holistic health through the model of eight dimensions was introduced. Group members participated in identifying behaviors and activities in each of the dimensions of wellness.  The importance of each dimension was reinforced and the concept of balance in life as it relates to wellness was explored.      Patient Session Goals / Objectives:  Verbalized understanding of balance in wellness and how it relates to their life  Identified and explained the eight dimensions of wellness  Categorized activities and wellness needs into corresponding dimensions appropriately during exercise        Patient Participation / Response:  Fully participated with the group by sharing personal reflections / insights and openly received / provided feedback with other participants.    Demonstrated understanding of topics discussed through group discussion and participation, Identified / Expressed personal readiness to practice skills, and Verbalized understanding of health maintenance topic    Treatment Plan:  Patient has a current master individualized treatment plan.  See Epic treatment plan for more information.    HERMINIA Bustamante

## 2025-02-13 NOTE — GROUP NOTE
Process Group Note    PATIENT'S NAME: Lily Albert  MRN:   [id number]  :   2005  ACCT. NUMBER: [id number]  DATE OF SERVICE: 25  START TIME:  9:00 AM  END TIME:  9:50 AM  FACILITATOR: Sherrie Tee LICSW  TOPIC: MH Process Group    Diagnoses:  296.32 (F33.1) Major Depressive Disorder, Recurrent Episode, Moderate _ and With anxious distress.  3. Other Diagnoses that is relevant to services:   300.02 (F41.1) Generalized Anxiety Disorder.    [facility] Adult Mental Health Outpatient Programs  TRACK: IOP 2    NUMBER OF PARTICIPANTS: 8        Data:    Session content: At the start of this group, patients were invited to check in by identifying themselves, describing their current emotional status, and identifying issues to address in this group.     Area(s) of treatment focus addressed in this session included Symptom Management, Personal Safety, Community Resources/Discharge Planning, Abstinence/Relapse Prevention, Develop / Improve Independent Living Skills, Develop Socialization / Interpersonal Relationship Skills, and Physical Health .    Lily was able to disclose her mood and physical health symptoms impacting her functioning. Lily reports depressed and anxious mood. Denies S/I or safety issues. She processed co-morbid physical health issues related to a Connective Disorder, Migranes, Scoliosis, heart issues and chronic pain.  She continues to consult with her outpatient medical providers. She recently consulted with her PCP who is assisting with having a wheelchair for when at college. She shared her feelings of frustration about the chronic nature of her health issues. Lily reports using coping skills for emotion regulation. Denies chemical use.         2025    11:00 AM   Suicide Ideation Check In   Since last session, how often have you had suicidal thoughts? No thoughts of suicide       Therapeutic Interventions/Treatment Strategies:  Psychotherapist offered  support, feedback and validation and reinforced use of skills. Treatment modalities used include Cognitive Behavioral Therapy.    Assessment:    Patient response:   Patient responded to session by verbalizing understanding    Possible barriers to participation / learning include: and no barriers identified    Health Issues:   Yes: Pain, Associated Psychological Distress  Chronic disease management, Associated Psychological Distress  Lily reports co-morbid physical health issues related to a Connective Disorder, Migranes, Scoliosis, heart issues and chronic pain.       Reports medication compliance.    Substance Use Review:   Substance Use: No active concerns identified.    Mental Status/Behavioral Observations  Appearance:   Appropriate   Eye Contact:   Good   Psychomotor Behavior: Normal   Attitude:   Cooperative  Interested  Orientation:   All  Speech   Rate / Production: Normal    Volume:  Normal   Mood:    Anxious  Depressed   Affect:    Appropriate  Worrisome   Thought Content:   Clear and Safety denies any current safety concerns including suicidal ideation, self-harm, and homicidal ideation  Thought Form:  Coherent  Goal Directed  Logical     Insight:    Good     Plan:   Safety Plan: No current safety concerns identified.  Recommended that patient call 911 or go to the local ED should there be a change in any of these risk factors.   Barriers to treatment: None identified  Patient Contracts (see media tab):  None  Substance Use: Not addressed in session   Continue or Discharge: Patient will continue in Adult Day Treatment (ADT)  as planned. Patient is likely to benefit from learning and using skills as they work toward the goals identified in their treatment plan.      Sherrie Tee, [facility]  February 13, 2025

## 2025-02-17 ENCOUNTER — HOSPITAL ENCOUNTER (OUTPATIENT)
Dept: BEHAVIORAL HEALTH | Facility: CLINIC | Age: 20
End: 2025-02-17
Attending: PSYCHIATRY & NEUROLOGY
Payer: COMMERCIAL

## 2025-02-17 PROCEDURE — 90853 GROUP PSYCHOTHERAPY: CPT

## 2025-02-17 PROCEDURE — 90853 GROUP PSYCHOTHERAPY: CPT | Performed by: SOCIAL WORKER

## 2025-02-17 NOTE — GROUP NOTE
Process Group Note    PATIENT'S NAME: Lily Albert  MRN:   [id number]  :   2005  ACCT. NUMBER: [id number]  DATE OF SERVICE: 25  START TIME:  9:00 AM  END TIME:  9:50 AM  FACILITATOR: Catina Reyes LICSW  TOPIC:  Process Group    Diagnoses:    Principal DSM5 Diagnoses  (Sustained by DSM5 Criteria Listed Above):   296.32 (F33.1) Major Depressive Disorder, Recurrent Episode, Moderate _ and With anxious distress.  3. Other Diagnoses that is relevant to services:   300.02 (F41.1) Generalized Anxiety Disorder.    [facility] Mental Health Outpatient Programs  TRACK: IOP 2     NUMBER OF PARTICIPANTS: 9        Data:    Session content: At the start of this group, patients were invited to check in by identifying themselves, describing their current emotional status, and identifying issues to address in this group.   Area(s) of treatment focus addressed in this session included Symptom Management and Personal Safety.    Patient reported feeling stressed. Patient discussed working toward emotional regulation. Patient identified thought distortions as skills they will use to address their goal(s). Patient reported self may be a barrier to working toward their goal(s) and/or addressing mental health symptoms. Patient reported no safety concerns and/or self-injurious behaviors. Patient reported no substance use. Patient reported they are taking their medications as prescribed. Patient reported feeling proud that they part of group. Patient discussed thoughts with the treatment group.              Therapeutic Interventions/Treatment Strategies:  Treatment modalities used include Cognitive Behavioral Therapy and Dialectical Behavioral Therapy.    Assessment:    Patient response:   Patient responded to session by accepting feedback, giving feedback, listening, focusing on goals, and being attentive    Possible barriers to participation / learning include: and no barriers identified    Health  Issues:   None reported       Substance Use Review:   Substance Use: No active concerns identified.    Mental Status/Behavioral Observations  Appearance:   Appropriate   Eye Contact:   Good   Psychomotor Behavior: Normal   Attitude:   Cooperative   Orientation:   All  Speech   Rate / Production: Normal    Volume:  Normal   Mood:    Normal  Affect:    Appropriate   Thought Content:   Clear  Thought Form:  Coherent  Logical     Insight:    Good     Plan:   Safety Plan: No current safety concerns identified.  Recommended that patient call 911 or go to the local ED should there be a change in any of these risk factors.   Barriers to treatment: None identified  Patient Contracts (see media tab):  None  Substance Use: Not addressed in session   Continue or Discharge: Patient will continue in Adult Day Treatment (ADT)  as planned. Patient is likely to benefit from learning and using skills as they work toward the goals identified in their treatment plan.      Catina Reyes, REFUGIOSW  February 17, 2025

## 2025-02-17 NOTE — GROUP NOTE
Psychotherapy Group Note    PATIENT'S NAME: Lily Albert  MRN:   [id number]  :   2005  ACCT. NUMBER: [id number]  DATE OF SERVICE: 25  START TIME: 11:00 AM  END TIME: 11:50 AM  FACILITATOR: Sherrie Tee LICSW  TOPIC: MH EBP Group: Relationship Skills  [facility] Mental Health Outpatient Programs  TRACK: IOP 2    NUMBER OF PARTICIPANTS: 8    Summary of Group / Topics Discussed:  Relationship Skills: Relationship Mapping: Patients identified different types of relationships they have in their life and examined if there is conflict or closeness, the degree of conflict or closeness, and the reason for conflict. The goal of this topic is to help patients gain awareness of the relationships they have with others, identify types of conflict in patients  lives and how they impact symptoms/functioning, and identify how they can improve relationships with relationship and interpersonal skills they have learned.     Patient Session Goals / Objectives:  Familiarized patients with awareness to the different types of relationships they may have with different people and substances in their lives  Discussed and practiced strategies to promote healthier understanding of interpersonal relationships with a focus on awareness of conflict, the causes of conflict, and the ways to transform conflict  Discussed the use of substances and its impact on their relationships      Patient Participation / Response:  Fully participated with the group by sharing personal reflections / insights and openly received / provided feedback with other participants.    Demonstrated understanding of topics discussed through group discussion and participation, Demonstrated understanding of relationship skills and communication skills, and Practiced skills in session    Treatment Plan:  Patient has a current master individualized treatment plan.  See Epic treatment plan for more information.    Sherrie Tee,  LICSW

## 2025-02-17 NOTE — ADDENDUM NOTE
Encounter addended by: Catina Reyes [facility] [place] on: 2/17/2025 12:58 PM   Actions taken: Flowsheet accepted

## 2025-02-17 NOTE — GROUP NOTE
Psychotherapy Group Note    PATIENT'S NAME: Lily Albert  MRN:   [id number]  :   2005  ACCT. NUMBER: [id number]  DATE OF SERVICE: 25  START TIME: 10:00 AM  END TIME: 10:50 AM  FACILITATOR: Catina Reyes LICSW  TOPIC: MH EBP Group: Relationship Skills  [facility] Mental Health Outpatient Programs  TRACK: IOP 2    NUMBER OF PARTICIPANTS: 9    Summary of Group / Topics Discussed:  Relationship Skills: Assertive Communication: Patients were provided with a general overview of assertive communication skills and how practicing assertive communication skills will assist patients in developing healthier and more effective relationships. Patients reviewed their current awareness on ability to practice assertive communication, ways to increase assertive communication, and identified/problem solved barriers to assertive communication.     Patient Session Goals / Objectives:  Identified and discussed patient individual challenges with communication  Presented and practiced effective communication skills in session  Assisted patients in implementing more effective communication skills in their relationships      Patient Participation / Response:  Fully participated with the group by sharing personal reflections / insights and openly received / provided feedback with other participants.    Demonstrated understanding of topics discussed through group discussion and participation, Demonstrated understanding of relationship skills and communication skills, and Identified / Expressed personal readiness to incorporate effective communication skills    Treatment Plan:  Patient has a current master individualized treatment plan.  See Epic treatment plan for more information.    HERMINIA Chen

## 2025-02-18 ENCOUNTER — HOSPITAL ENCOUNTER (OUTPATIENT)
Dept: BEHAVIORAL HEALTH | Facility: CLINIC | Age: 20
End: 2025-02-18
Attending: PSYCHIATRY & NEUROLOGY
Payer: COMMERCIAL

## 2025-02-18 PROCEDURE — 90853 GROUP PSYCHOTHERAPY: CPT

## 2025-02-18 NOTE — GROUP NOTE
Psychotherapy Group Note    PATIENT'S NAME: Lily Albert  MRN:   [id number]  :   2005  ACCT. NUMBER: [id number]  DATE OF SERVICE: 25  START TIME: 10:00 AM  END TIME: 10:50 AM  FACILITATOR: Catina Reyes LICSW  TOPIC: MH EBP Group: Relationship Skills  [facility]  TRACK: IOP 2     NUMBER OF PARTICIPANTS: 8    Summary of Group / Topics Discussed:  Relationship Skills: Assertive Communication: Patients were provided with a general overview of assertive communication skills and how practicing assertive communication skills will assist patients in developing healthier and more effective relationships. Patients reviewed their current awareness on ability to practice assertive communication, ways to increase assertive communication, and identified/problem solved barriers to assertive communication.     Patient Session Goals / Objectives:  Identified and discussed patient individual challenges with communication  Presented and practiced effective communication skills in session  Assisted patients in implementing more effective communication skills in their relationships      Patient Participation / Response:  Fully participated with the group by sharing personal reflections / insights and openly received / provided feedback with other participants.    Demonstrated understanding of topics discussed through group discussion and participation, Demonstrated understanding of relationship skills and communication skills, and Verbalized understanding of communication skills, communication challenges, and communication strengths    Treatment Plan:  Patient has a current master individualized treatment plan.  See Epic treatment plan for more information.    HERMINIA Chen

## 2025-02-18 NOTE — GROUP NOTE
Psychotherapy Group Note    PATIENT'S NAME: Lily Albert  MRN:   [id number]  :   2005  ACCT. NUMBER: [id number]  DATE OF SERVICE: 25  START TIME: 11:00 AM  END TIME: 11:50 AM  FACILITATOR: Emiliana Liao LICSW  TOPIC: MH EBP Group: Relationship Skills  [facility]  TRACK: IOP 2    NUMBER OF PARTICIPANTS: 8    Summary of Group / Topics Discussed:  Relationship Skills: Boundaries: Patients were provided with a general overview of interpersonal boundaries and how lack of boundaries relates to symptoms and functioning. The purpose is to help patients identify boundary issues and gain awareness and skills to work towards healthier interpersonal boundaries. Current awareness of healthy boundary characteristics and barriers to establishing healthy boundaries were discussed.    Patient Session Goals / Objectives:  Familiarized patients with the concept of interpersonal boundaries and their characteristics  Discussed and practiced strategies to promote healthier interpersonal boundaries  Identified boundary issues and identified plan to improve boundaries      Patient Participation / Response:  Fully participated with the group by sharing personal reflections / insights and openly received / provided feedback with other participants.    Demonstrated understanding of topics discussed through group discussion and participation, Demonstrated understanding of relationship skills and communication skills, and Identified / Expressed personal readiness to incorporate effective communication skills    Treatment Plan:  Patient has a current master individualized treatment plan.  See Epic treatment plan for more information.    HERMINIA Strong

## 2025-02-18 NOTE — GROUP NOTE
Process Group Note    PATIENT'S NAME: Lily Albert  MRN:   [id number]  :   2005  ACCT. NUMBER: [id number]  DATE OF SERVICE: 25  START TIME:  9:00 AM  END TIME:  9:50 AM  FACILITATOR: Catina Reyes LICSW  TOPIC:  Process Group    Diagnoses:    Principal DSM5 Diagnoses  (Sustained by DSM5 Criteria Listed Above):   296.32 (F33.1) Major Depressive Disorder, Recurrent Episode, Moderate _ and With anxious distress.  3. Other Diagnoses that is relevant to services:   300.02 (F41.1) Generalized Anxiety Disorder.    [facility]  TRACK: IOP 2     NUMBER OF PARTICIPANTS: 8        Data:    Session content: At the start of this group, patients were invited to check in by identifying themselves, describing their current emotional status, and identifying issues to address in this group.   Area(s) of treatment focus addressed in this session included Symptom Management and Personal Safety.    Patient reported feeling fatigue. Patient discussed working toward negative core beliefs. Patient identified CBT as skills they will use to address their goal(s). Patient reported self may be a barrier to working toward their goal(s) and/or addressing mental health symptoms. Patient reported no safety concerns and/or self-injurious behaviors. Patient reported no substance use. Patient reported they are taking their medications as prescribed. Patient reported feeling proud that they part of group. Patient discussed physical concerns with the treatment group.              Therapeutic Interventions/Treatment Strategies:  Treatment modalities used include Cognitive Behavioral Therapy and Dialectical Behavioral Therapy.    Assessment:    Patient response:   Patient responded to session by accepting feedback, giving feedback, listening, and focusing on goals    Possible barriers to participation / learning include: and no barriers identified    Health Issues:   None reported        Substance Use Review:   Substance Use: No active concerns identified.    Mental Status/Behavioral Observations  Appearance:   Appropriate   Eye Contact:   Good   Psychomotor Behavior: Normal   Attitude:   Cooperative   Orientation:   All  Speech   Rate / Production: Normal    Volume:  Normal   Mood:    Normal  Affect:    Appropriate   Thought Content:   Clear  Thought Form:  Coherent  Logical     Insight:    Good     Plan:   Safety Plan: No current safety concerns identified.  Recommended that patient call 911 or go to the local ED should there be a change in any of these risk factors.   Barriers to treatment: None identified  Patient Contracts (see media tab):  None  Substance Use: Not addressed in session   Continue or Discharge: Patient will continue in Adult Day Treatment (ADT)  as planned. Patient is likely to benefit from learning and using skills as they work toward the goals identified in their treatment plan.      Catina Reyes, [facility]  February 18, 2025

## 2025-02-18 NOTE — ADDENDUM NOTE
Encounter addended by: Catina Reyes, [facility] on: 2/18/2025 3:03 PM   Actions taken: Flowsheet accepted

## 2025-02-19 ENCOUNTER — LAB (OUTPATIENT)
Dept: LAB | Facility: CLINIC | Age: 20
End: 2025-02-19
Payer: COMMERCIAL

## 2025-02-19 DIAGNOSIS — M81.0 OSTEOPOROSIS, UNSPECIFIED OSTEOPOROSIS TYPE, UNSPECIFIED PATHOLOGICAL FRACTURE PRESENCE: ICD-10-CM

## 2025-02-19 DIAGNOSIS — Q87.89 LOEYS-DIETZ SYNDROME: ICD-10-CM

## 2025-02-19 LAB
ALP SERPL-CCNC: 90 U/L (ref 40–150)
ANION GAP SERPL CALCULATED.3IONS-SCNC: 14 MMOL/L (ref 7–15)
BUN SERPL-MCNC: 10 MG/DL (ref 6–20)
CALCIUM SERPL-MCNC: 9.8 MG/DL (ref 8.8–10.4)
CALCIUM UR-MCNC: 10.4 MG/DL
CALCIUM/CREAT UR: 0.27 G/G CR (ref 0.05–0.27)
CHLORIDE SERPL-SCNC: 106 MMOL/L (ref 98–107)
CREAT SERPL-MCNC: 0.84 MG/DL (ref 0.51–0.95)
CREAT UR-MCNC: 38.7 MG/DL
EGFRCR SERPLBLD CKD-EPI 2021: >90 ML/MIN/1.73M2
GLUCOSE SERPL-MCNC: 103 MG/DL (ref 70–99)
HCO3 SERPL-SCNC: 20 MMOL/L (ref 22–29)
MAGNESIUM SERPL-MCNC: 2.1 MG/DL (ref 1.7–2.3)
PHOSPHATE SERPL-MCNC: 4.6 MG/DL (ref 2.5–4.5)
POTASSIUM SERPL-SCNC: 4.3 MMOL/L (ref 3.4–5.3)
PTH-INTACT SERPL-MCNC: 20 PG/ML (ref 15–65)
SODIUM SERPL-SCNC: 140 MMOL/L (ref 135–145)
VIT D+METAB SERPL-MCNC: 42 NG/ML (ref 20–50)

## 2025-02-19 PROCEDURE — 84100 ASSAY OF PHOSPHORUS: CPT

## 2025-02-19 PROCEDURE — 36415 COLL VENOUS BLD VENIPUNCTURE: CPT

## 2025-02-19 PROCEDURE — 82306 VITAMIN D 25 HYDROXY: CPT

## 2025-02-19 PROCEDURE — 83735 ASSAY OF MAGNESIUM: CPT

## 2025-02-19 PROCEDURE — 84075 ASSAY ALKALINE PHOSPHATASE: CPT

## 2025-02-19 PROCEDURE — 83970 ASSAY OF PARATHORMONE: CPT

## 2025-02-19 PROCEDURE — 80048 BASIC METABOLIC PNL TOTAL CA: CPT

## 2025-02-19 PROCEDURE — 82340 ASSAY OF CALCIUM IN URINE: CPT

## 2025-02-20 ENCOUNTER — HOSPITAL ENCOUNTER (OUTPATIENT)
Dept: BEHAVIORAL HEALTH | Facility: CLINIC | Age: 20
End: 2025-02-20
Attending: PSYCHIATRY & NEUROLOGY
Payer: COMMERCIAL

## 2025-02-20 PROCEDURE — 90853 GROUP PSYCHOTHERAPY: CPT

## 2025-02-20 NOTE — GROUP NOTE
Psychotherapy Group Note    PATIENT'S NAME: Lily Albert  MRN:   [id number]  :   2005  ACCT. NUMBER: [id number]  DATE OF SERVICE: 25  START TIME: 10:00 AM  END TIME: 10:50 AM  FACILITATOR: Catina Reyes LICSW  TOPIC: MH EBP Group: Relationship Skills  [facility]  TRACK: IOP 2     NUMBER OF PARTICIPANTS: 8    Summary of Group / Topics Discussed:  Relationship Skills: Dependencies: Patients were provided with a general overview of different types of dependencies and how they relate to symptoms and functioning. The purpose is to help patients identify the different types of dependencies, how they may be impacted by them, and to gain awareness and skills to work towards healthier relationships.    Patient Session Goals / Objectives:  Familiarized patients with the concept of interpersonal relationships and their characteristics.    Discussed and practiced strategies to promote healthier understanding of interpersonal relationships with a focus on dependencies  Identified types of dependencies in patient s lives and how they impact their symptoms and functioning      Patient Participation / Response:  Fully participated with the group by sharing personal reflections / insights and openly received / provided feedback with other participants.    Demonstrated understanding of topics discussed through group discussion and participation, Demonstrated understanding of relationship skills and communication skills, and Identified / Expressed personal readiness to incorporate effective communication skills    Treatment Plan:  Patient has a current master individualized treatment plan.  See Epic treatment plan for more information.    HERMINIA Chen

## 2025-02-20 NOTE — GROUP NOTE
Process Group Note    PATIENT'S NAME: Lily Albert  MRN:   [id number]  :   2005  ACCT. NUMBER: [id number]  DATE OF SERVICE: 25  START TIME:  9:00 AM  END TIME:  9:50 AM  FACILITATOR: Catina Reyes LICSW  TOPIC:  Process Group    Diagnoses:    Principal DSM5 Diagnoses  (Sustained by DSM5 Criteria Listed Above):   296.32 (F33.1) Major Depressive Disorder, Recurrent Episode, Moderate _ and With anxious distress.  3. Other Diagnoses that is relevant to services:   300.02 (F41.1) Generalized Anxiety Disorder.    [facility]  TRACK: IOP 2     NUMBER OF PARTICIPANTS: 8        Data:    Session content: At the start of this group, patients were invited to check in by identifying themselves, describing their current emotional status, and identifying issues to address in this group.   Area(s) of treatment focus addressed in this session included Symptom Management and Personal Safety.    Patient reported feeling sad. Patient discussed working toward wellbeing. Patient identified grounding as skills they will use to address their goal(s). Patient reported medical concerns may be a barrier to working toward their goal(s) and/or addressing mental health symptoms. Patient reported no safety concerns and/or self-injurious behaviors. Patient reported no substance use. Patient reported they are taking their medications as prescribed. Patient reported feeling proud that they part of group. Patient discussed medical concerns with the treatment group.              Therapeutic Interventions/Treatment Strategies:  Treatment modalities used include Cognitive Behavioral Therapy and Dialectical Behavioral Therapy.    Assessment:    Patient response:   Patient responded to session by accepting feedback, giving feedback, listening, and focusing on goals    Possible barriers to participation / learning include: and no barriers identified    Health Issues:   None reported        Substance Use Review:   Substance Use: No active concerns identified.    Mental Status/Behavioral Observations  Appearance:   Appropriate   Eye Contact:   Good   Psychomotor Behavior: Normal   Attitude:   Cooperative   Orientation:   All  Speech   Rate / Production: Normal    Volume:  Normal   Mood:    Normal  Affect:    Appropriate   Thought Content:   Clear  Thought Form:  Coherent  Logical     Insight:    Good     Plan:   Safety Plan: No current safety concerns identified.  Recommended that patient call 911 or go to the local ED should there be a change in any of these risk factors.   Barriers to treatment: None identified  Patient Contracts (see media tab):  None  Substance Use: Not addressed in session   Continue or Discharge: Patient will continue in Adult Day Treatment (ADT)  as planned. Patient is likely to benefit from learning and using skills as they work toward the goals identified in their treatment plan.      Catina Reyes, [facility]  February 20, 2025

## 2025-02-23 ENCOUNTER — OFFICE VISIT (OUTPATIENT)
Dept: URGENT CARE | Facility: URGENT CARE | Age: 20
End: 2025-02-23
Payer: COMMERCIAL

## 2025-02-23 VITALS
DIASTOLIC BLOOD PRESSURE: 61 MMHG | RESPIRATION RATE: 18 BRPM | HEART RATE: 108 BPM | OXYGEN SATURATION: 96 % | SYSTOLIC BLOOD PRESSURE: 87 MMHG | WEIGHT: 156 LBS | TEMPERATURE: 98.6 F | BODY MASS INDEX: 22.54 KG/M2

## 2025-02-23 DIAGNOSIS — G43.909 MIGRAINE WITHOUT STATUS MIGRAINOSUS, NOT INTRACTABLE, UNSPECIFIED MIGRAINE TYPE: Primary | ICD-10-CM

## 2025-02-23 PROCEDURE — 99214 OFFICE O/P EST MOD 30 MIN: CPT | Mod: 25 | Performed by: PHYSICIAN ASSISTANT

## 2025-02-23 PROCEDURE — 96372 THER/PROPH/DIAG INJ SC/IM: CPT | Performed by: PHYSICIAN ASSISTANT

## 2025-02-23 RX ORDER — KETOROLAC TROMETHAMINE 30 MG/ML
30 INJECTION, SOLUTION INTRAMUSCULAR; INTRAVENOUS ONCE
Status: COMPLETED | OUTPATIENT
Start: 2025-02-23 | End: 2025-02-23

## 2025-02-23 RX ADMIN — KETOROLAC TROMETHAMINE 30 MG: 30 INJECTION, SOLUTION INTRAMUSCULAR; INTRAVENOUS at 12:15

## 2025-02-23 NOTE — PROGRESS NOTES
Assessment & Plan     Migraine without status migrainosus, not intractable, unspecified migraine type  Pt has had good results to toradol in the past, she has no contraindications. Normal renal function, no blood thinners. Pt tolerated well and will go home to rest.   Push fluids at home.  If tnis is not helpful recommend consideration of ED visit or ADS visit tomorrow.  Given information for ADS and if not resolved may call RN to get referral to ADS or connect with me I am happy to coordinate ADS visit.    Pt agreeable with plan.      - ketorolac (TORADOL) injection 30 mg             Munira Montez PA-C  [facility] [place]    Xiao Bautista is a 19 year old female who presents to clinic today for the following health issues:  Chief Complaint   Patient presents with    Headache     X 4-5 days took ibuprofen and zyrtec today         2/23/2025    11:56 AM   Additional Questions   Roomed by yuko abdalla   Accompanied by mom     HPI    Pt is a 19 year old female, who presents to urgent care with concern re: HA.  She has a hx of migraine with aura. Characteristics of HA are consistent with her typical migraine but just lasting longer.  Came premenstrually as it typically does.    She is managed with botox, magnesium/riboflavin, gabapentin, Neutec and ibuprofen.    Pt denies any T/N/W of arms or legs, hearing changes or vision changes (typical aura but no other changes), vomiting.  She does have nausea, has zofran at home for this.    No fevers.        Review of Systems  Constitutional, HEENT, cardiovascular, pulmonary, gi and gu systems are negative, except as otherwise noted.      Objective    BP (!) 87/61   Pulse 108   Temp 98.6  F (37  C) (Tympanic)   Resp 18   Wt 70.8 kg (156 lb)   SpO2 96%   BMI 22.54 kg/m    Physical Exam   Pt is in no acute distress and appears well  Aad appears in pain, prefers lights off, dark room. Participates in exam.   A and O x 3  Neck: non-tender  midline with full AROM    PERRL  EOMI  CN 2-12 grossly intact  Muscular strength, sensation and DTR are symmetrical and WNL for upper and lower ext  [place]omburg test is negative

## 2025-02-23 NOTE — PATIENT INSTRUCTIONS
If ongoing symptoms despite the toradol consider ER visit or you could be seen in the ADS if needed in [place].      You must have a referral to be seen, you could contact the clinic and leave a message for me ( I will be working at the [place] urgent care tomorrow), you could walk in to the [place] urgent care at 10:00 am, or you could call the nurse triage and let them know you have ongoing migraine and wondering if you can be seen in the [place] ADS as the RN triage can refer to the ADS as well.

## 2025-02-24 ENCOUNTER — HOSPITAL ENCOUNTER (OUTPATIENT)
Dept: BEHAVIORAL HEALTH | Facility: CLINIC | Age: 20
End: 2025-02-24
Attending: PSYCHIATRY & NEUROLOGY
Payer: COMMERCIAL

## 2025-02-24 PROCEDURE — 90853 GROUP PSYCHOTHERAPY: CPT

## 2025-02-24 NOTE — GROUP NOTE
Process Group Note    PATIENT'S NAME: Lily Albert  MRN:   [id number]  :   2005  ACCT. NUMBER: [id number]  DATE OF SERVICE: 25  START TIME:  9:00 AM  END TIME:  9:50 AM  FACILITATOR: Catina Reyes LICSW  TOPIC:  Process Group    Diagnoses:    Principal DSM5 Diagnoses  (Sustained by DSM5 Criteria Listed Above):   296.32 (F33.1) Major Depressive Disorder, Recurrent Episode, Moderate _ and With anxious distress.  3. Other Diagnoses that is relevant to services:   300.02 (F41.1) Generalized Anxiety Disorder.    [facility]  TRACK: IOP 2     NUMBER OF PARTICIPANTS: 6        Data:    Session content: At the start of this group, patients were invited to check in by identifying themselves, describing their current emotional status, and identifying issues to address in this group.   Area(s) of treatment focus addressed in this session included Symptom Management and Personal Safety.    Patient reported feeling angry. Patient discussed working toward emotional regulation. Patient identified mindfulness as skills they will use to address their goal(s). Patient reported self may be a barrier to working toward their goal(s) and/or addressing mental health symptoms. Patient reported no safety concerns and/or self-injurious behaviors. Patient reported no substance use. Patient reported they are taking their medications as prescribed. Patient reported feeling proud that they part of group. Patient discussed interpersonal concerns with the treatment group.              Therapeutic Interventions/Treatment Strategies:  Treatment modalities used include Cognitive Behavioral Therapy and Dialectical Behavioral Therapy.    Assessment:    Patient response:   Patient responded to session by accepting feedback, giving feedback, listening, focusing on goals, and being attentive    Possible barriers to participation / learning include: and no barriers identified    Health  Issues:   None reported       Substance Use Review:   Substance Use: No active concerns identified.    Mental Status/Behavioral Observations  Appearance:   Appropriate   Eye Contact:   Good   Psychomotor Behavior: Normal   Attitude:   Cooperative   Orientation:   All  Speech   Rate / Production: Normal    Volume:  Normal   Mood:    Normal  Affect:    Appropriate   Thought Content:   Clear  Thought Form:  Coherent  Logical     Insight:    Good     Plan:   Safety Plan: No current safety concerns identified.  Recommended that patient call 911 or go to the local ED should there be a change in any of these risk factors.   Barriers to treatment: None identified  Patient Contracts (see media tab):  None  Substance Use: Not addressed in session   Continue or Discharge: Patient will continue in Adult Day Treatment (ADT)  as planned. Patient is likely to benefit from learning and using skills as they work toward the goals identified in their treatment plan.      Catina Reyes, [facility]  February 24, 2025

## 2025-02-24 NOTE — GROUP NOTE
Psychotherapy Group Note    PATIENT'S NAME: Lily Albert  MRN:   [id number]  :   2005  ACCT. NUMBER: [id number]  DATE OF SERVICE: 25  START TIME: 10:00 AM  END TIME: 10:50 AM  FACILITATOR: Catina Reyes LICSW  TOPIC: MH EBP Group: Relationship Skills  [facility]  TRACK: IOP 2     NUMBER OF PARTICIPANTS: 6    Summary of Group / Topics Discussed:  Relationship Skills: Conflict Resolution: Topic of conflict resolution in interpersonal communication was discussed. Patients received an overview of how communication skills during times of conflict impact symptoms and functioning. Patients discussed their current communication challenges and how this impacts their functioning. Patients also verbalized understanding of skills learned and practiced in session.     Patient Session Goals / Objectives:  Identified and discussed patient individual challenges with communication  Presented and practiced effective communication skills in session  Assisted patients in implementing more effective communication skills in their relationships      Patient Participation / Response:  Fully participated with the group by sharing personal reflections / insights and openly received / provided feedback with other participants.    Demonstrated understanding of topics discussed through group discussion and participation, Demonstrated understanding of relationship skills and communication skills, and Identified / Expressed personal readiness to incorporate effective communication skills    Treatment Plan:  Patient has a current master individualized treatment plan.  See Epic treatment plan for more information.    HERMINIA Chen

## 2025-02-24 NOTE — GROUP NOTE
Psychotherapy Group Note    PATIENT'S NAME: Lily Albert  MRN:   [id number]  :   2005  ACCT. NUMBER: [id number]  DATE OF SERVICE: 25  START TIME: 11:00 AM  END TIME: 11:50 AM  FACILITATOR: Emiliana Liao LICSW  TOPIC: MH EBP Group: Coping Skills  [facility]  TRACK: IOP 2    NUMBER OF PARTICIPANTS: 6    Summary of Group / Topics Discussed:  Coping Skills: Additional Coping Skills/Worry Management part 2:  Patients discussed and practiced WORRY MANAGEMENT TECHNIQUES.  Reviewed the benefits of applying the aforementioned coping strategies.  Patients explored how these strategies might be applied to daily stressors or distressing situations.    Patient Session Goals / Objectives:  Understand the purpose and benefits of applying WORRY MANAGEMENT coping strategies  Identify preferred worry management skills  Address barriers to utilizing coping skills when in distress.      Patient Participation / Response:  Fully participated with the group by sharing personal reflections / insights and openly received / provided feedback with other participants.    Demonstrated understanding of topics discussed through group discussion and participation, Expressed understanding of the relevance / importance of coping skills at distressing times in life, and Demonstrated knowledge of when to consider using a variety of coping skills in daily life    Treatment Plan:  Patient has a current master individualized treatment plan.  See Epic treatment plan for more information.    HERMINIA Strong

## 2025-02-25 ENCOUNTER — HOSPITAL ENCOUNTER (OUTPATIENT)
Dept: BEHAVIORAL HEALTH | Facility: CLINIC | Age: 20
End: 2025-02-25
Attending: PSYCHIATRY & NEUROLOGY
Payer: COMMERCIAL

## 2025-02-25 PROCEDURE — 90853 GROUP PSYCHOTHERAPY: CPT

## 2025-02-25 NOTE — GROUP NOTE
Psychotherapy Group Note    PATIENT'S NAME: Lily Albert  MRN:   [id number]  :   2005  ACCT. NUMBER: [id number]  DATE OF SERVICE: 25  START TIME:  9:00 AM  END TIME:  9:50 AM  FACILITATOR: Catina Reyes LICSW  TOPIC: MH EBP Group: Coping Skills  [facility]  TRACK: IOP 2     NUMBER OF PARTICIPANTS: 8    Summary of Group / Topics Discussed:  Coping Skills: Radical Acceptance: Patients learned radical acceptance as a way to tolerate heightened stress in the moment.  Patients identified situations that necessitate radical acceptance.  They focused on applying acceptance of the moment to tolerate difficult emotions and events. Patients discussed how to distinguish when this can be useful in their lives and when other skills are more relevant or helpful.    Patient Session Goals / Objectives:  Understand that some amount of pain exists for each of us in our lives  Process the difficulty of acceptance in our lives and benefits of radical acceptance to mood and functioning.  Demonstrate understanding of when to use the radical acceptance to tolerate distress by providing examples of situations where this could be applied.  Identify barriers to applying radical acceptance to difficult situations and explore strategies to overcome them  Discussed Willing vs. Willfulness         Patient Participation / Response:  {OP  PROGRESS NOTE PATIENT PARTICIPATION:222231}    {OP  PROGRESS NOTE PATIENT RESPONSE - COPING SKILLS:079906}    Treatment Plan:  Patient has {OP  PROGRAMMATIC TX PLAN STATUS:292202}    HERMINIA Chen

## 2025-02-25 NOTE — GROUP NOTE
Psychotherapy Group Note    PATIENT'S NAME: Lily Albert  MRN:   [id number]  :   2005  ACCT. NUMBER: [id number]  DATE OF SERVICE: 25  START TIME: 11:00 AM  END TIME: 11:50 AM  FACILITATOR: Emiliana Liao LICSW  TOPIC: MH EBP Group: Self-Awareness  [facility]  TRACK: IOP 2    NUMBER OF PARTICIPANTS: 8    Summary of Group / Topics Discussed:  Self-Awareness: Gratitude: Topic focused on assisting patients in identifying key concepts in gratitude. Patients discussed the benefits of practicing gratitude and its impact on mood improvement, mindfulness, and perspective. Patients worked to increase time spent on recognition and appreciation of what is positive and working in their lives. Patients discussed the concepts and benefits of feeling grateful. The goal is to reduce rumination and negative thinking resulting in increased mindfulness and resilience. Patients specifically discussed how they can practice and problem solve barriers to daily gratitude practice.     Patient Session Goals / Objectives:  [place] the concept and benefits of gratitude  Identified ways to practice gratitude in daily life  Problem solved barriers to practicing gratitude      Patient Participation / Response:  Fully participated with the group by sharing personal reflections / insights and openly received / provided feedback with other participants.    Demonstrated understanding of topics discussed through group discussion and participation and Demonstrated understanding of values, strengths, and challenges to learn about themselves    Treatment Plan:  Patient has a current master individualized treatment plan.  See Epic treatment plan for more information.    HERMINIA Strong

## 2025-02-25 NOTE — GROUP NOTE
Process Group Note    PATIENT'S NAME: Lily Albert  MRN:   [id number]  :   2005  ACCT. NUMBER: [id number]  DATE OF SERVICE: 25  START TIME:  9:00 AM  END TIME:  9:50 AM  FACILITATOR: Catina Reyes LICSW  TOPIC:  Process Group    Diagnoses:    Principal DSM5 Diagnoses  (Sustained by DSM5 Criteria Listed Above):   296.32 (F33.1) Major Depressive Disorder, Recurrent Episode, Moderate _ and With anxious distress.  3. Other Diagnoses that is relevant to services:   300.02 (F41.1) Generalized Anxiety Disorder.       [facility]  TRACK: IOP 2     NUMBER OF PARTICIPANTS: 8        Data:    Session content: At the start of this group, patients were invited to check in by identifying themselves, describing their current emotional status, and identifying issues to address in this group.   Area(s) of treatment focus addressed in this session included Symptom Management and Personal Safety.    Patient reported feeling relieved. Patient discussed working toward communication. Patient identified assertiveness as skills they will use to address their goal(s). Patient reported self may be a barrier to working toward their goal(s) and/or addressing mental health symptoms. Patient reported no safety concerns and/or self-injurious behaviors. Patient reported no substance use. Patient reported they are  taking their medications as prescribed. Patient reported feeling proud that they part of group. Patient discussed communication with the treatment group.              Therapeutic Interventions/Treatment Strategies:  Treatment modalities used include Cognitive Behavioral Therapy and Dialectical Behavioral Therapy.    Assessment:    Patient response:   Patient responded to session by accepting feedback, giving feedback, listening, and focusing on goals    Possible barriers to participation / learning include: and no barriers identified    Health Issues:   None reported        Substance Use Review:   Substance Use: No active concerns identified.    Mental Status/Behavioral Observations  Appearance:   Appropriate   Eye Contact:   Good   Psychomotor Behavior: Normal   Attitude:   Cooperative   Orientation:   All  Speech   Rate / Production: Normal    Volume:  Normal   Mood:    Normal  Affect:    Appropriate   Thought Content:   Clear  Thought Form:  Coherent  Logical     Insight:    Good     Plan:   Safety Plan: No current safety concerns identified.  Recommended that patient call 911 or go to the local ED should there be a change in any of these risk factors.   Barriers to treatment: None identified  Patient Contracts (see media tab):  None  Substance Use: Not addressed in session   Continue or Discharge: Patient will continue in Adult Day Treatment (ADT)  as planned. Patient is likely to benefit from learning and using skills as they work toward the goals identified in their treatment plan.      Catina Reyes, [facility] Hospital[facility]  February 25, 2025

## 2025-02-25 NOTE — GROUP NOTE
Psychotherapy Group Note    PATIENT'S NAME: Lily Albert  MRN:   [id number]  :   2005  ACCT. NUMBER: [id number]  DATE OF SERVICE: 25  START TIME: 10:00 AM  END TIME: 10:50 AM  FACILITATOR: Catina Reyes LICSW  TOPIC: MH EBP Group: Coping Skills  [facility]  TRACK: IOP 2     NUMBER OF PARTICIPANTS: 8    Summary of Group / Topics Discussed:  Coping Skills: Radical Acceptance: Patients learned radical acceptance as a way to tolerate heightened stress in the moment.  Patients identified situations that necessitate radical acceptance.  They focused on applying acceptance of the moment to tolerate difficult emotions and events. Patients discussed how to distinguish when this can be useful in their lives and when other skills are more relevant or helpful.    Patient Session Goals / Objectives:  Understand that some amount of pain exists for each of us in our lives  Process the difficulty of acceptance in our lives and benefits of radical acceptance to mood and functioning.  Demonstrate understanding of when to use the radical acceptance to tolerate distress by providing examples of situations where this could be applied.  Identify barriers to applying radical acceptance to difficult situations and explore strategies to overcome them  Discussed Willingness vs. Willfulness       Patient Participation / Response:  Fully participated with the group by sharing personal reflections / insights and openly received / provided feedback with other participants.    Demonstrated understanding of topics discussed through group discussion and participation, Expressed understanding of the relevance / importance of coping skills at distressing times in life, Demonstrated knowledge of when to consider using a variety of coping skills in daily life, and Identified barriers to applying coping skills    Treatment Plan:  Patient has a current master individualized treatment plan.   See Epic treatment plan for more information.    Catina Reyes, LICSW

## 2025-02-27 ENCOUNTER — HOSPITAL ENCOUNTER (OUTPATIENT)
Dept: BEHAVIORAL HEALTH | Facility: CLINIC | Age: 20
End: 2025-02-27
Attending: PSYCHIATRY & NEUROLOGY
Payer: COMMERCIAL

## 2025-02-27 PROCEDURE — 90853 GROUP PSYCHOTHERAPY: CPT

## 2025-02-27 NOTE — GROUP NOTE
Psychotherapy Group Note    PATIENT'S NAME: Lily Albert  MRN:   [id number]  :   2005  ACCT. NUMBER: [id number]  DATE OF SERVICE: 25  START TIME: 11:00 AM  END TIME: 11:50 AM  FACILITATOR: Emiliana Liao LICSW  TOPIC:  EBP Group: [facility]  TRACK: IOP 2    NUMBER OF PARTICIPANTS: 8    Summary of Group / Topics Discussed:  Mindfulness: What is Mindfulness: Patients received an overview on what mindfulness is and how mindfulness can benefit general health, mental health symptoms, and stressors. The history of mindfulness, its application to mental health therapies, and key concepts were also discussed. Patients discussed current awareness, knowledge, and practice of mindfulness skills. Patients also discussed barriers to mindfulness practice.     Patient Session Goals / Objectives:  Demonstrated understanding of key concepts and application to daily life  Identified when/how to use mindfulness   Resolved barriers to practice  Identified plan to use mindfulness in daily life      Patient Participation / Response:  Fully participated with the group by sharing personal reflections / insights and openly received / provided feedback with other participants.    Demonstrated understanding of topics discussed through group discussion and participation, Demonstrated understanding of mindfulness skills and benefits of practice, and Identified / Expressed personal readiness to practice mindfulness skills    Treatment Plan:  Patient has a current master individualized treatment plan.  See Epic treatment plan for more information.    HERMINIA Strong

## 2025-02-27 NOTE — GROUP NOTE
Psychotherapy Group Note    PATIENT'S NAME: Lily Albert  MRN:   [id number]  :   2005  ACCT. NUMBER: [id number]  DATE OF SERVICE: 25  START TIME: 10:00 AM  END TIME: 10:50 AM  FACILITATOR: Catina Reyes LICSW  TOPIC: MH EBP Group: Coping Skills  [facility]  TRACK: IOP 2     NUMBER OF PARTICIPANTS: 8    Summary of Group / Topics Discussed:  Coping Skills: Building Positive Experiences: Patients discussed the importance of planning and engaging in positive experiences, as strategies to increase positive thinking, hope, and self-worth.  Explored the benefits of planning / creating positive experiences, including recognizing and reducing negativity bias by focusing on and building positive experiences.   Several approaches to building positive experiences were presented and discussed relevant to each patient.      Patient Session Goals / Objectives:  Understand the purpose of planning / creating / participating / sharing in positive experiences.  Explore patient s experiences related to negative thinking and how it influences activities and moodIdentify current positive events in patient s life.   Set goals to increase a variety of positive experiences.  Address barriers to planning / engaging in positive experiences.      Patient Participation / Response:  Fully participated with the group by sharing personal reflections / insights and openly received / provided feedback with other participants.    Demonstrated understanding of topics discussed through group discussion and participation, Expressed understanding of the relevance / importance of coping skills at distressing times in life, Demonstrated knowledge of when to consider using a variety of coping skills in daily life, and Identified barriers to applying coping skills    Treatment Plan:  Patient has a current master individualized treatment plan.  See Epic treatment plan for more  information.    Catina Reyes, LICSW

## 2025-02-27 NOTE — GROUP NOTE
Process Group Note    PATIENT'S NAME: Lily Albert  MRN:   [id number]  :   2005  ACCT. NUMBER: [id number]  DATE OF SERVICE: 25  START TIME:  9:00 AM  END TIME:  9:50 AM  FACILITATOR: Catina Reyes LICSW  TOPIC:  Process Group    Diagnoses:    Principal DSM5 Diagnoses  (Sustained by DSM5 Criteria Listed Above):   296.32 (F33.1) Major Depressive Disorder, Recurrent Episode, Moderate _ and With anxious distress.  3. Other Diagnoses that is relevant to services:   300.02 (F41.1) Generalized Anxiety Disorder.    [facility]  TRACK: IOP 2     NUMBER OF PARTICIPANTS: 8        Data:    Session content: At the start of this group, patients were invited to check in by identifying themselves, describing their current emotional status, and identifying issues to address in this group.   Area(s) of treatment focus addressed in this session included Symptom Management and Personal Safety.    Patient reported feeling neutral. Patient discussed working toward radical acceptance. Patient identified willingness as skills they will use to address their goal(s). Patient reported self may be a barrier to working toward their goal(s) and/or addressing mental health symptoms. Patient reported no safety concerns and/or self-injurious behaviors. Patient reported no substance use. Patient reported they are taking their medications as prescribed. Patient reported feeling proud that they part of group. Patient discussed willingness with the treatment group.              Therapeutic Interventions/Treatment Strategies:  Treatment modalities used include Cognitive Behavioral Therapy and Dialectical Behavioral Therapy.    Assessment:    Patient response:   Patient responded to session by accepting feedback, giving feedback, listening, focusing on goals, and being attentive    Possible barriers to participation / learning include: and no barriers identified    Health Issues:   None  reported       Substance Use Review:   Substance Use: No active concerns identified.    Mental Status/Behavioral Observations  Appearance:   Appropriate   Eye Contact:   Good   Psychomotor Behavior: Normal   Attitude:   Cooperative   Orientation:   All  Speech   Rate / Production: Normal    Volume:  Normal   Mood:    Normal  Affect:    Appropriate   Thought Content:   Clear  Thought Form:  Coherent  Logical     Insight:    Good     Plan:   Safety Plan: No current safety concerns identified.  Recommended that patient call 911 or go to the local ED should there be a change in any of these risk factors.   Barriers to treatment: None identified  Patient Contracts (see media tab):  None  Substance Use: Not addressed in session   Continue or Discharge: Patient will continue in Adult Day Treatment (ADT)  as planned. Patient is likely to benefit from learning and using skills as they work toward the goals identified in their treatment plan.      Catina Reyes, LICSW  February 27, 2025

## 2025-03-03 ENCOUNTER — HOSPITAL ENCOUNTER (OUTPATIENT)
Dept: BEHAVIORAL HEALTH | Facility: CLINIC | Age: 20
End: 2025-03-03
Attending: PSYCHIATRY & NEUROLOGY
Payer: COMMERCIAL

## 2025-03-03 PROCEDURE — 90853 GROUP PSYCHOTHERAPY: CPT

## 2025-03-03 NOTE — GROUP NOTE
Process Group Note    PATIENT'S NAME: Lily Albert  MRN:   [id number]  :   2005  ACCT. NUMBER: [id number]  DATE OF SERVICE: 3/03/25  START TIME:  9:00 AM  END TIME:  9:50 AM  FACILITATOR: Catina Reyes LICSW  TOPIC:  Process Group    Diagnoses:  Principal DSM5 Diagnoses  (Sustained by DSM5 Criteria Listed Above):   296.32 (F33.1) Major Depressive Disorder, Recurrent Episode, Moderate _ and With anxious distress.  3. Other Diagnoses that is relevant to services:   300.02 (F41.1) Generalized Anxiety Disorder.      [facility]  TRACK: IOP 2     NUMBER OF PARTICIPANTS: 6        Data:    Session content: At the start of this group, patients were invited to check in by identifying themselves, describing their current emotional status, and identifying issues to address in this group.   Area(s) of treatment focus addressed in this session included Symptom Management and Personal Safety.    Patient reported feeling overwhelmed. Patient discussed working toward interpersonal wellness. Patient identified assertiveness as skills they will use to address their goal(s). Patient reported self may be a barrier to working toward their goal(s) and/or addressing mental health symptoms. Patient reported no safety concerns and/or self-injurious behaviors. Patient reported no substance use. Patient reported they are taking their medications as prescribed. Patient reported feeling proud that they part of group. Patient discussed interpersonal concerns with the treatment group.              Therapeutic Interventions/Treatment Strategies:  Treatment modalities used include Cognitive Behavioral Therapy and Dialectical Behavioral Therapy.    Assessment:    Patient response:   Patient responded to session by accepting feedback, giving feedback, listening, focusing on goals, and being attentive    Possible barriers to participation / learning include: and no barriers  identified    Health Issues:   None reported       Substance Use Review:   Substance Use: No active concerns identified.    Mental Status/Behavioral Observations  Appearance:   Appropriate   Eye Contact:   Good   Psychomotor Behavior: Normal   Attitude:   Cooperative   Orientation:   All  Speech   Rate / Production: Normal    Volume:  Normal   Mood:    Normal  Affect:    Appropriate   Thought Content:   Clear  Thought Form:  Coherent  Logical     Insight:    Good     Plan:   Safety Plan: No current safety concerns identified.  Recommended that patient call 911 or go to the local ED should there be a change in any of these risk factors.   Barriers to treatment: None identified  Patient Contracts (see media tab):  None  Substance Use: Not addressed in session   Continue or Discharge: Patient will continue in Adult Day Treatment (ADT)  as planned. Patient is likely to benefit from learning and using skills as they work toward the goals identified in their treatment plan.      Catina Reyes, [facility]  March 3, 2025

## 2025-03-03 NOTE — GROUP NOTE
Psychotherapy Group Note    PATIENT'S NAME: Lily Albert  MRN:   [id number]  :   2005  ACCT. NUMBER: [id number]  DATE OF SERVICE: 3/03/25  START TIME: 11:00 AM  END TIME: 11:50 AM  FACILITATOR: Emiliana Liao LICSW  TOPIC: MH EBP Group: Coping Skills  [facility]  TRACK: IOP 2    NUMBER OF PARTICIPANTS: 6    Summary of Group / Topics Discussed:  Coping Skills: Building Positive Experiences: Patients discussed the importance of planning and engaging in positive experiences, as strategies to increase positive thinking, hope, and self-worth.  Explored the benefits of planning / creating positive experiences, including recognizing and reducing negativity bias by focusing on and building positive experiences.   Several approaches to building positive experiences were presented and discussed relevant to each patient.      Patient Session Goals / Objectives:  Understand the purpose of planning / creating / participating / sharing in positive experiences.  Explore patient s experiences related to negative thinking and how it influences activities and moodIdentify current positive events in patient s life.   Set goals to increase a variety of positive experiences.  Address barriers to planning / engaging in positive experiences.      Patient Participation / Response:  Fully participated with the group by sharing personal reflections / insights and openly received / provided feedback with other participants.    Demonstrated understanding of topics discussed through group discussion and participation, Expressed understanding of the relevance / importance of coping skills at distressing times in life, and Demonstrated knowledge of when to consider using a variety of coping skills in daily life    Treatment Plan:  Patient has a current master individualized treatment plan.  See Epic treatment plan for more information.    HERMINIA Strong

## 2025-03-03 NOTE — GROUP NOTE
Psychoeducation Group Note    PATIENT'S NAME: Lily Albert  MRN:   [id number]  :   2005  ACCT. NUMBER: [id number]  DATE OF SERVICE: 3/03/25  START TIME: 10:00 AM  END TIME: 10:50 AM  FACILITATOR: Catina Reyes LICSW; Carolin Gee OTR  TOPIC:  Wellness Group: [facility]  TRACK: IOP 2     NUMBER OF PARTICIPANTS: 6    Summary of Group / Topics Discussed:    Coping Cards: Provided education on the benefits of developing a robust coping plan to help manage distress and regulate emotions.  Discussed the importance of having easy access to this plan in times of increased symptoms.  Brainstormed with group members to identify coping skills in the categories of sensory strategies, distraction techniques, cognitive strategies, and physical activity.  Instructed patients to select 1-2 skills from each category to include in their deck of coping cards, then provided time and materials for patients to create this deck.  Prompted patients to consider where they might keep this tool and when they might find it most helpful.         Patient Session Goals / Objectives:   Review the benefits of having a robust coping plan to help manage distress and regulate emotions   Develop a deck of personal coping cards to promote self-regulation and independent skill use   Established a plan for practice of these skills in their own environments           Patient Participation / Response:  Fully participated with the group by sharing personal reflections / insights and openly received / provided feedback with other participants.    Demonstrated understanding of topics discussed through group discussion and participation and Identified / Expressed personal readiness to practice skills    Treatment Plan:  Patient has a current master individualized treatment plan.  See Epic treatment plan for more information.    HERMINIA Chen

## 2025-03-04 ENCOUNTER — HOSPITAL ENCOUNTER (OUTPATIENT)
Dept: BEHAVIORAL HEALTH | Facility: CLINIC | Age: 20
End: 2025-03-04
Attending: PSYCHIATRY & NEUROLOGY
Payer: COMMERCIAL

## 2025-03-04 PROCEDURE — 90853 GROUP PSYCHOTHERAPY: CPT

## 2025-03-04 PROCEDURE — 90853 GROUP PSYCHOTHERAPY: CPT | Performed by: COUNSELOR

## 2025-03-04 NOTE — GROUP NOTE
Psychotherapy Group Note    PATIENT'S NAME: Lily Albert  MRN:   [id number]  :   2005  ACCT. NUMBER: [id number]  DATE OF SERVICE: 3/04/25  START TIME: 10:00 AM  END TIME: 10:50 AM  FACILITATOR: Catina Reyes LICSW  TOPIC:  DBT Group: Distress Tolerance  [facility]  TRACK: IOP 2    NUMBER OF PARTICIPANTS: 7    Summary of Group/Topics Discussed:  Distress Tolerance: Distress Tolerance skills teach Patients how to tolerate and survive crisis situations without making things worse. The Crisis Survival Skills cover techniques for tolerating painful events, urges, and emotions when you cannot make things better right away. The Reality Acceptance Skills show Patients how to reduce suffering by helping them accept and enter fully into life even when it is not the life they want. Today Patients practiced radical acceptance      Patient Session Goals/Objectives:  Demonstrate understanding of Crisis survival skills and radical acceptance skills.  Engage in discussion around the use of radical acceptance  Identify barriers to using radical acceptance and problem solve with group members.  Verbalize ways that radical acceptance can be used in their own lives.      Patient Participation / Response:  Fully participated with the group by sharing personal reflections / insights and openly received / provided feedback with other participants.    Demonstrated understanding of topics discussed through group discussion and participation, Expressed understanding of the relationship between behaviors, thoughts, and feelings, Shared experiences and challenges with making behavioral changes, and Identified barriers to change    Treatment Plan:  Patient has a current master individualized treatment plan.  See Epic treatment plan for more information.    HERMINIA Chen

## 2025-03-04 NOTE — GROUP NOTE
Psychotherapy Group Note    PATIENT'S NAME: Lily Albert  MRN:   [id number]  :   2005  ACCT. NUMBER: [id number]  DATE OF SERVICE: 3/04/25  START TIME: 11:00 AM  END TIME: 11:50 AM  FACILITATOR: Praful Barbosa LMFT  TOPIC:  EBP Group: Emotions Management  [facility]  TRACK: IOP2    NUMBER OF PARTICIPANTS: 7    Summary of Group / Topics Discussed:  Emotions Management: Model of Emotions: Patients were introduced to the cyclical model of emotions.  Explored emotions are shaped by different events and one s interpretation of events.  Group discussed how emotions begin with an event, followed by one s interpretation, followed by associated feelings.  Discussion included a review of personal urges and actions that can/do follow an emotional experience in the patient s life, and the end results.    Patient Session Goals / Objectives:  Demonstrate understanding of types various emotions.  Identify and discuss specific emotions and when they occur; understand triggers.  Identify individual emotions and physical sensations that accompany them.  Discuss urges and actions, and how to influence the intensity of emotional reactions and disrupt the cycle.    Discuss barriers to emotional regulation.  Choose 1-2 strategies to assist with emotional response to potentially distressing situations.      Patient Participation / Response:  Fully participated with the group by sharing personal reflections / insights and openly received / provided feedback with other participants.    Demonstrated understanding of topics discussed through group discussion and participation, Expressed understanding of the relevance / importance of emotions management skills at distressing times in life, and Identified / Expressed personal readiness to practice new emotions management skills    Treatment Plan:  Patient has a current master individualized treatment plan.  See Epic treatment plan  for more information.    Praful Barbosa LMFT

## 2025-03-04 NOTE — GROUP NOTE
Process Group Note    PATIENT'S NAME: Lily Albert  MRN:   [id number]  :   2005  ACCT. NUMBER: [id number]  DATE OF SERVICE: 3/04/25  START TIME:  9:00 AM  END TIME:  9:50 AM  FACILITATOR: Catina Reyes LICSW  TOPIC:  Process Group    Diagnoses:  Principal DSM5 Diagnoses  (Sustained by DSM5 Criteria Listed Above):   296.32 (F33.1) Major Depressive Disorder, Recurrent Episode, Moderate _ and With anxious distress.  3. Other Diagnoses that is relevant to services:   300.02 (F41.1) Generalized Anxiety Disorder.      [facility] Outpatient Programs  TRACK: IOP 2     NUMBER OF PARTICIPANTS: 7        Data:    Session content: At the start of this group, patients were invited to check in by identifying themselves, describing their current emotional status, and identifying issues to address in this group.   Area(s) of treatment focus addressed in this session included Symptom Management and Personal Safety.    Patient reported feeling anxious. Patient discussed working toward emotional regulation. Patient identified cope ahead as skills they will use to address their goal(s). Patient reported self may be a barrier to working toward their goal(s) and/or addressing mental health symptoms. Patient reported no safety concerns and/or self-injurious behaviors. Patient reported no substance use. Patient reported they are taking their medications as prescribed. Patient reported feeling proud that they part of group. Patient discussed cope ahead with the treatment group.              Therapeutic Interventions/Treatment Strategies:  Treatment modalities used include Cognitive Behavioral Therapy and Dialectical Behavioral Therapy.    Assessment:    Patient response:   Patient responded to session by accepting feedback, giving feedback, listening, and focusing on goals    Possible barriers to participation / learning include: and no barriers identified    Health Issues:   None reported        Substance Use Review:   Substance Use: No active concerns identified.    Mental Status/Behavioral Observations  Appearance:   Appropriate   Eye Contact:   Good   Psychomotor Behavior: Normal   Attitude:   Cooperative   Orientation:   All  Speech   Rate / Production: Normal    Volume:  Normal   Mood:    Normal  Affect:    Appropriate   Thought Content:   Clear  Thought Form:  Coherent  Logical     Insight:    Good     Plan:   Safety Plan: No current safety concerns identified.  Recommended that patient call 911 or go to the local ED should there be a change in any of these risk factors.   Barriers to treatment: None identified  Patient Contracts (see media tab):  None  Substance Use: Not addressed in session   Continue or Discharge: Patient will continue in Adult Day Treatment (ADT)  as planned. Patient is likely to benefit from learning and using skills as they work toward the goals identified in their treatment plan.      Catina Reyes, [facility] CenterSW  March 4, 2025

## 2025-03-06 ENCOUNTER — PATIENT OUTREACH (OUTPATIENT)
Dept: CARE COORDINATION | Facility: CLINIC | Age: 20
End: 2025-03-06
Payer: COMMERCIAL

## 2025-03-06 ENCOUNTER — TRANSFERRED RECORDS (OUTPATIENT)
Dept: HEALTH INFORMATION MANAGEMENT | Facility: CLINIC | Age: 20
End: 2025-03-06
Payer: COMMERCIAL

## 2025-03-10 ENCOUNTER — HOSPITAL ENCOUNTER (OUTPATIENT)
Dept: BEHAVIORAL HEALTH | Facility: CLINIC | Age: 20
End: 2025-03-10
Attending: PSYCHIATRY & NEUROLOGY
Payer: COMMERCIAL

## 2025-03-10 PROCEDURE — 90853 GROUP PSYCHOTHERAPY: CPT

## 2025-03-10 NOTE — GROUP NOTE
Psychotherapy Group Note    PATIENT'S NAME: Lily Albert  MRN:   [id number]  :   2005  ACCT. NUMBER: [id number]  DATE OF SERVICE: 3/10/25  START TIME: 11:00 AM  END TIME: 11:50 AM  FACILITATOR: Emiliana Liao LICSW  TOPIC: MH EBP Group: Cognitive Restructuring  [facility] Outpatient Programs  TRACK: IOP 2    NUMBER OF PARTICIPANTS: 7    Summary of Group / Topics Discussed:  Cognitive Restructuring: Distortions: Patients received an overview of how to identify common cognitive distortions. Patients will explore alternatives to cognitive distortions and practice challenging their negative thought patterns. The goal is to help patients target modify ineffective thought patterns.     Patient Session Goals / Objectives:  Familiarized self with ineffective / unhealthy thoughts and how they develop.    Explored impact of ineffective thoughts / distortions on mood and activity  Formulated new neutral/positive alternatives to challenge less helpful / ineffective thoughts.  Practiced and plan to apply in daily life             Patient Participation / Response:  Fully participated with the group by sharing personal reflections / insights and openly received / provided feedback with other participants.    Demonstrated understanding of topics discussed through group discussion and participation, Expressed understanding of the relationship between behaviors, thoughts, and feelings, and Demonstrated knowledge of personal thought patterns and how they impact their mood and behavior.    Treatment Plan:  Patient has a current master individualized treatment plan.  See Epic treatment plan for more information.    HERMINIA Strong

## 2025-03-10 NOTE — GROUP NOTE
Psychotherapy Group Note    PATIENT'S NAME: Lily Albert  MRN:   [id number]  :   2005  ACCT. NUMBER: [id number]  DATE OF SERVICE: 3/10/25  START TIME: 10:00 AM  END TIME: 10:50 AM  FACILITATOR: Catina Reyes LICSW  TOPIC:  EBP Group: Cognitive Restructuring  [facility] Mental Health Outpatient Programs  TRACK: IOP 2    NUMBER OF PARTICIPANTS: 7    Summary of Group / Topics Discussed:  Cognitive Restructuring: Introduction and Overview: Patients were introduced to Cognitive Behavioral Therapy (CBT) as a way to identify ineffective thought patterns, understand factors that contribute to ineffective thought patterns, gain awareness of the impact of those thoughts on emotions and behavior, and learn methods for modifying thinking to achieve better mood regulation. Patients received a general overview of how ones thoughts influence feelings and behaviors in the context of CBT. Patients explored the development of their own thought patterns and how they impact daily functioning.      Patient Session Goals / Objectives:  Familiarize self with the interrelationship of thoughts, feelings and behavior.  Identify ineffective / unhelpful thought patterns and their impact on mood.             Patient Participation / Response:  Fully participated with the group by sharing personal reflections / insights and openly received / provided feedback with other participants.    Demonstrated understanding of topics discussed through group discussion and participation, Expressed understanding of the relationship between behaviors, thoughts, and feelings, and Demonstrated knowledge of personal thought patterns and how they impact their mood and behavior.    Treatment Plan:  Patient has a current master individualized treatment plan.  See Epic treatment plan for more information.    HERMINIA Chen

## 2025-03-10 NOTE — GROUP NOTE
Process Group Note    PATIENT'S NAME: Lily Albert  MRN:   [id number]  :   2005  ACCT. NUMBER: [id number]  DATE OF SERVICE: 3/10/25  START TIME:  9:00 AM  END TIME:  9:50 AM  FACILITATOR: Catina Reyes LICSW  TOPIC:  Process Group    Diagnoses:    Principal DSM5 Diagnoses  (Sustained by DSM5 Criteria Listed Above):   296.32 (F33.1) Major Depressive Disorder, Recurrent Episode, Moderate _ and With anxious distress.  3. Other Diagnoses that is relevant to services:   300.02 (F41.1) Generalized Anxiety Disorder.    [facility] Mental Health Outpatient Programs  TRACK: IOP 2    NUMBER OF PARTICIPANTS: 7        Data:    Session content: At the start of this group, patients were invited to check in by identifying themselves, describing their current emotional status, and identifying issues to address in this group.   Area(s) of treatment focus addressed in this session included Symptom Management and Personal Safety.    Patient reported feeling stressed. Patient discussed working toward decision making. Patient identified radical acceptance as skills they will use to address their goal(s). Patient reported self may be a barrier to working toward their goal(s) and/or addressing mental health symptoms. Patient reported no safety concerns and/or self-injurious behaviors. Patient reported no substance use. Patient reported they are taking their medications as prescribed. Patient reported feeling proud that they part of group. Patient discussed decision making with the treatment group.              Therapeutic Interventions/Treatment Strategies:  Treatment modalities used include Cognitive Behavioral Therapy and Dialectical Behavioral Therapy.    Assessment:    Patient response:   Patient responded to session by accepting feedback, giving feedback, listening, and focusing on goals    Possible barriers to participation / learning include: and no barriers identified    Health Issues:   None  reported       Substance Use Review:   Substance Use: No active concerns identified.    Mental Status/Behavioral Observations  Appearance:   Appropriate   Eye Contact:   Good   Psychomotor Behavior: Normal   Attitude:   Cooperative   Orientation:   All  Speech   Rate / Production: Normal    Volume:  Normal   Mood:    Normal  Affect:    Appropriate   Thought Content:   Clear  Thought Form:  Coherent  Logical     Insight:    Good     Plan:   Safety Plan: No current safety concerns identified.  Recommended that patient call 911 or go to the local ED should there be a change in any of these risk factors.   Barriers to treatment: None identified  Patient Contracts (see media tab):  None  Substance Use: Not addressed in session   Continue or Discharge: Patient will continue in Adult Day Treatment (ADT)  as planned. Patient is likely to benefit from learning and using skills as they work toward the goals identified in their treatment plan.      Catina Reyes, LICSW  March 10, 2025

## 2025-03-11 ENCOUNTER — HOSPITAL ENCOUNTER (OUTPATIENT)
Dept: BEHAVIORAL HEALTH | Facility: CLINIC | Age: 20
End: 2025-03-11
Attending: PSYCHIATRY & NEUROLOGY
Payer: COMMERCIAL

## 2025-03-11 PROCEDURE — 90853 GROUP PSYCHOTHERAPY: CPT

## 2025-03-11 NOTE — GROUP NOTE
Psychotherapy Group Note    PATIENT'S NAME: Lily Albert  MRN:   [id number]  :   2005  ACCT. NUMBER: [id number]  DATE OF SERVICE: 3/11/25  START TIME: 11:00 AM  END TIME: 11:50 AM  FACILITATOR: Emiliana Liao LICSW  TOPIC: MH EBP Group: Cognitive Restructuring  [facility] Outpatient Programs  TRACK: IOP 2    NUMBER OF PARTICIPANTS: 7    Summary of Group / Topics Discussed:  Cognitive Restructuring: Distortions part 2: Patients received an overview of how to identify common cognitive distortions. Patients will explore alternatives to cognitive distortions and practice challenging their negative thought patterns. The goal is to help patients target modify ineffective thought patterns.     Patient Session Goals / Objectives:  Familiarized self with ineffective / unhealthy thoughts and how they develop.    Explored impact of ineffective thoughts / distortions on mood and activity  Formulated new neutral/positive alternatives to challenge less helpful / ineffective thoughts.  Practiced and plan to apply in daily life             Patient Participation / Response:  Fully participated with the group by sharing personal reflections / insights and openly received / provided feedback with other participants.    Demonstrated understanding of topics discussed through group discussion and participation, Expressed understanding of the relationship between behaviors, thoughts, and feelings, and Demonstrated knowledge of personal thought patterns and how they impact their mood and behavior.    Treatment Plan:  Patient has a current master individualized treatment plan.  See Epic treatment plan for more information.    HERMINIA Strong

## 2025-03-11 NOTE — GROUP NOTE
Psychotherapy Group Note    PATIENT'S NAME: Lily Albert  MRN:   [id number]  :   2005  ACCT. NUMBER: [id number]  DATE OF SERVICE: 3/11/25  START TIME: 10:00 AM  END TIME: 10:50 AM  FACILITATOR: Catina Reyes LICSW  TOPIC:  EBP Group: [facility] Adult Mental Health Outpatient Programs  TRACK: IOP 2    NUMBER OF PARTICIPANTS: 7    Summary of Group / Topics Discussed:  Mindfulness: What is Mindfulness: Patients received an overview on what mindfulness is and how mindfulness can benefit general health, mental health symptoms, and stressors. The history of mindfulness, its application to mental health therapies, and key concepts were also discussed. Patients discussed current awareness, knowledge, and practice of mindfulness skills. Patients also discussed barriers to mindfulness practice.     Patient Session Goals / Objectives:  Demonstrated understanding of key concepts and application to daily life  Identified when/how to use mindfulness   Resolved barriers to practice  Identified plan to use mindfulness in daily life      Patient Participation / Response:  Fully participated with the group by sharing personal reflections / insights and openly received / provided feedback with other participants.    Demonstrated understanding of topics discussed through group discussion and participation, Demonstrated understanding of mindfulness skills and benefits of practice, and Identified / Expressed personal readiness to practice mindfulness skills    Treatment Plan:  Patient has a current master individualized treatment plan.  See Epic treatment plan for more information.    HERMINIA Chen

## 2025-03-11 NOTE — GROUP NOTE
Process Group Note    PATIENT'S NAME: Lily Albert  MRN:   [id number]  :   2005  ACCT. NUMBER: [id number]  DATE OF SERVICE: 3/11/25  START TIME:  9:00 AM  END TIME:  9:50 AM  FACILITATOR: Catina Reyes LICSW  TOPIC:  Process Group    Diagnoses:    Principal DSM5 Diagnoses  (Sustained by DSM5 Criteria Listed Above):   296.32 (F33.1) Major Depressive Disorder, Recurrent Episode, Moderate _ and With anxious distress.  3. Other Diagnoses that is relevant to services:   300.02 (F41.1) Generalized Anxiety Disorder.    [facility] Mental [facility] Outpatient Programs  TRACK: IOP 2     NUMBER OF PARTICIPANTS: 7        Data:    Session content: At the start of this group, patients were invited to check in by identifying themselves, describing their current emotional status, and identifying issues to address in this group.   Area(s) of treatment focus addressed in this session included Symptom Management and Personal Safety.    Patient reported feeling overwhelmed. Patient discussed working toward emotions. Patient identified emotional regulation as skills they will use to address their goal(s). Patient reported self may be a barrier to working toward their goal(s) and/or addressing mental health symptoms. Patient reported no safety concerns and/or self-injurious behaviors. Patient reported no substance use. Patient reported they are taking their medications as prescribed. Patient reported feeling proud that they part of group. Patient discussed emotions with the treatment group.              Therapeutic Interventions/Treatment Strategies:  Treatment modalities used include Cognitive Behavioral Therapy and Dialectical Behavioral Therapy.    Assessment:    Patient response:   Patient responded to session by accepting feedback, giving feedback, listening, focusing on goals, and being attentive    Possible barriers to participation / learning include: and no barriers identified    Health Issues:   None  reported       Substance Use Review:   Substance Use: No active concerns identified.    Mental Status/Behavioral Observations  Appearance:   Appropriate   Eye Contact:   Good   Psychomotor Behavior: Normal   Attitude:   Cooperative   Orientation:   All  Speech   Rate / Production: Normal    Volume:  Normal   Mood:    Normal  Affect:    Appropriate   Thought Content:   Clear  Thought Form:  Coherent  Logical     Insight:    Good     Plan:   Safety Plan: No current safety concerns identified.  Recommended that patient call 911 or go to the local ED should there be a change in any of these risk factors.   Barriers to treatment: None identified  Patient Contracts (see media tab):  None  Substance Use: Not addressed in session   Continue or Discharge: Patient will continue in Adult Day Treatment (ADT)  as planned. Patient is likely to benefit from learning and using skills as they work toward the goals identified in their treatment plan.      Catina Reyes, LICSW  March 11, 2025

## 2025-03-17 ENCOUNTER — HOSPITAL ENCOUNTER (OUTPATIENT)
Dept: BEHAVIORAL HEALTH | Facility: CLINIC | Age: 20
End: 2025-03-17
Attending: PSYCHIATRY & NEUROLOGY
Payer: COMMERCIAL

## 2025-03-17 PROCEDURE — 90853 GROUP PSYCHOTHERAPY: CPT

## 2025-03-17 NOTE — GROUP NOTE
Psychotherapy Group Note    PATIENT'S NAME: Lily Albert  MRN:   [id number]  :   2005  ACCT. NUMBER: [id number]  DATE OF SERVICE: 3/17/25  START TIME: 10:00 AM  END TIME: 10:50 AM  FACILITATOR: Catina Reyes LICSW  TOPIC:  EBP Group: Coping Skills  [facility] Adult Partial Hospitalization Program  TRACK: IOP 2     NUMBER OF PARTICIPANTS: 7    Summary of Group / Topics Discussed:  Coping Skills: Additional Coping Skills:  Patients discussed and practiced check the facts.  Reviewed the benefits of applying the aforementioned coping strategies.  Patients explored how these strategies might be applied to daily stressors or distressing situations.    Patient Session Goals / Objectives:  Understand the purpose and benefits of applying check the facts coping strategies  Discussed check the facts   Address barriers to utilizing coping skills when in distress.      Patient Participation / Response:  Fully participated with the group by sharing personal reflections / insights and openly received / provided feedback with other participants.    Demonstrated understanding of topics discussed through group discussion and participation, Expressed understanding of the relevance / importance of coping skills at distressing times in life, Demonstrated knowledge of when to consider using a variety of coping skills in daily life, and Identified barriers to applying coping skills    Treatment Plan:  Patient has a current master individualized treatment plan.  See Epic treatment plan for more information.    HERMINIA Chen

## 2025-03-17 NOTE — GROUP NOTE
Process Group Note    PATIENT'S NAME: Lily Albert  MRN:   [id number]  :   2005  ACCT. NUMBER: [id number]  DATE OF SERVICE: 3/17/25  START TIME:  9:00 AM  END TIME:  9:50 AM  FACILITATOR: Catina Reyes LICSW  TOPIC:  Process Group    Diagnoses:    Principal DSM5 Diagnoses  (Sustained by DSM5 Criteria Listed Above):   296.32 (F33.1) Major Depressive Disorder, Recurrent Episode, Moderate _ and With anxious distress.  3. Other Diagnoses that is relevant to services:   300.02 (F41.1) Generalized Anxiety Disorder.    [facility] Mental Health Outpatient Programs  TRACK: IOP 2    NUMBER OF PARTICIPANTS: 7        Data:    Session content: At the start of this group, patients were invited to check in by identifying themselves, describing their current emotional status, and identifying issues to address in this group.   Area(s) of treatment focus addressed in this session included Symptom Management and Personal Safety.    Patient reported feeling frustrated. Patient discussed working toward communication. Patient identified boundaries as skills they will use to address their goal(s). Patient reported self may be a barrier to working toward their goal(s) and/or addressing mental health symptoms. Patient reported no safety concerns and/or self-injurious behaviors. Patient reported no substance use. Patient reported they are taking their medications as prescribed. Patient reported feeling proud that they part of group. Patient discussed boundaries with the treatment group.              Therapeutic Interventions/Treatment Strategies:  Treatment modalities used include Cognitive Behavioral Therapy and Dialectical Behavioral Therapy.    Assessment:    Patient response:   Patient responded to session by accepting feedback, giving feedback, listening, and focusing on goals    Possible barriers to participation / learning include: and no barriers identified    Health Issues:   None reported        Substance Use Review:   Substance Use: No active concerns identified.    Mental Status/Behavioral Observations  Appearance:   Appropriate   Eye Contact:   Good   Psychomotor Behavior: Normal   Attitude:   Cooperative   Orientation:   All  Speech   Rate / Production: Normal    Volume:  Normal   Mood:    Normal  Affect:    Appropriate   Thought Content:   Clear  Thought Form:  Coherent  Logical     Insight:    Good     Plan:   Safety Plan: No current safety concerns identified.  Recommended that patient call 911 or go to the local ED should there be a change in any of these risk factors.   Barriers to treatment: None identified  Patient Contracts (see media tab):  None  Substance Use: Not addressed in session   Continue or Discharge: Patient will continue in Adult Day Treatment (ADT)  as planned. Patient is likely to benefit from learning and using skills as they work toward the goals identified in their treatment plan.      Catina Reyes, [facility]  March 17, 2025

## 2025-03-17 NOTE — GROUP NOTE
Psychotherapy Group Note    PATIENT'S NAME: Lily Albert  MRN:   [id number]  :   2005  ACCT. NUMBER: [id number]  DATE OF SERVICE: 3/17/25  START TIME: 11:00 AM  END TIME: 11:50 AM  FACILITATOR: Emiliaan Liao LICSW  TOPIC: MH EBP Group: Behavioral Activation  [facility] Outpatient Programs  TRACK: IOP 2    NUMBER OF PARTICIPANTS: 7    Summary of Group / Topics Discussed:  Behavioral Activation: Motivation and Procrastination: Patients explored how they currently spend their time, identifying thoughts and feelings that are motivating and serve to increase desired behaviors.  They also examined behaviors that contribute to procrastination.  Different types of procrastination behaviors were identified, and strategies to reduce individual procrastination and increase motivation were explored and practiced.  Patients identified ways to increase goal-directed activities to enhance mood and reduce symptoms.        Patient Session Goals / Objectives:  Identify current patterns of procrastination behavior and how they influence thoughts and moods, and inhibit motivation.  Identify behaviors that can be implemented that contribute to improving thoughts and feelings, motivation, and reduce symptoms.  Identify and develop a plan to increase activities that promote a sense of accomplishment and competence.  Practice scheduling positive activities / behaviors into daily routines.      Patient Participation / Response:  Fully participated with the group by sharing personal reflections / insights and openly received / provided feedback with other participants.    Demonstrated understanding of topics discussed through group discussion and participation, Expressed understanding of the relationship between behaviors, thoughts, and feelings, and Shared experiences and challenges with making behavioral changes    Treatment Plan:  Patient has a current master individualized treatment plan.  See  Epic treatment plan for more information.    Emiliana Camarena, LICSW

## 2025-03-18 ENCOUNTER — HOSPITAL ENCOUNTER (OUTPATIENT)
Dept: BEHAVIORAL HEALTH | Facility: CLINIC | Age: 20
End: 2025-03-18
Attending: PSYCHIATRY & NEUROLOGY
Payer: COMMERCIAL

## 2025-03-18 PROCEDURE — 90853 GROUP PSYCHOTHERAPY: CPT

## 2025-03-18 PROCEDURE — 90853 GROUP PSYCHOTHERAPY: CPT | Performed by: PSYCHOLOGIST

## 2025-03-18 NOTE — GROUP NOTE
Psychotherapy Group Note    PATIENT'S NAME: Lily Alebrt  MRN:   [id number]  :   2005  ACCT. NUMBER: [id number]  DATE OF SERVICE: 3/18/25  START TIME: 10:00 AM  END TIME: 10:50 AM  FACILITATOR: Kelli Oswald LGSW  TOPIC: MH EBP Group: Behavioral Activation  [facility] Outpatient Programs  TRACK: IOP 2    NUMBER OF PARTICIPANTS: 7    Summary of Group / Topics Discussed:  Behavioral Activation: The Change Process - Behavior Change: Patients explored the process and types of change, including but not limited to, theories of change, steps to making change, methods of changing behavior, and potential barriers.  Patients worked to identify what changes may benefit their daily lives, and work towards a plan to implement change.      Patient Session Goals / Objectives:  Demonstrate understanding of the change process.    Identify positive and negative behavioral patterns.  Make plans to track and implement changes and share experiences in group.  Identify personal barriers to change      Patient Participation / Response:  Fully participated with the group by sharing personal reflections / insights and openly received / provided feedback with other participants.    Demonstrated understanding of topics discussed through group discussion and participation and Expressed understanding of the relationship between behaviors, thoughts, and feelings    Treatment Plan:  Patient has a current master individualized treatment plan.  See Epic treatment plan for more information.    RENITA Esquivel

## 2025-03-18 NOTE — GROUP NOTE
"Process Group Note    PATIENT'S NAME: Lily Albert  MRN:   [id number]  :   2005  ACCT. NUMBER: [id number]  DATE OF SERVICE: 3/18/25  START TIME:  9:00 AM  END TIME:  9:50 AM  FACILITATOR: Laura Waller Psy.D, LP  TOPIC:  Process Group    Diagnoses:    296.32 (F33.1) Major Depressive Disorder, Recurrent Episode, Moderate _ and With anxious distress.  3. Other Diagnoses that is relevant to services:   300.02 (F41.1) Generalized Anxiety Disorder.  [facility]  TRACK: iop2    NUMBER OF PARTICIPANTS: 7        Data:    Session content: At the start of this group, patients were invited to check in by identifying themselves, describing their current emotional status, and identifying issues to address in this group.   Area(s) of treatment focus addressed in this session included Symptom Management, Personal Safety, and Community Resources/Discharge Planning.  Client reported being safe today.  Reported mood is \"irritated.\"   Goal for today is to attend group therapy. 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 She talked about the disability department at the [facility] [facility] and how she talked to her advisor about this. She stated that she had an argument with her mom and wants to avoid her mom for now. She reported using TIPP and different breathing techniques to manage symptoms.              Suicidal Ideation Check-in    Since last session, how often have you had suicidal thoughts? 0-none, 1- 2-3-4-5    0         Therapeutic Interventions/Treatment Strategies:  Psychotherapist reinforced use of skills. Treatment modalities used include Cognitive Behavioral Therapy and Dialectical Behavioral Therapy. Interventions include Coping Skills: Assisted patient in identifying 1-2 healthy distraction skills to reduce overall " distress and Reviewed patients current calming practices and discussed a more formal way of practicing and accessing skills, Symptoms Management: Promoted understanding of their diagnoses and how it impacts their functioning, and Emotions Management:  Discussed barriers to emotional regulation.    Assessment:    Patient response:   Patient responded to session by giving feedback, listening, and being attentive    Possible barriers to participation / learning include: severity of symptoms    Health Issues:   None reported       Substance Use Review:   Substance Use: No active concerns identified.    Mental Status/Behavioral Observations  Appearance:   Appropriate   Eye Contact:   Good   Psychomotor Behavior: Normal   Attitude:   Cooperative   Orientation:   All  Speech   Rate / Production: Normal    Volume:  Normal   Mood:    Anxious  Depressed   Affect:    Constricted   Thought Content:   Rumination  Thought Form:  Coherent  Logical     Insight:    Good     Plan:   Safety Plan: Recommended that patient call 911 or go to the local ED should there be a change in any of these risk factors.   Barriers to treatment: None identified  Patient Contracts (see media tab):  None  Substance Use: Not addressed in session   Continue or Discharge: Patient will continue in Adult Day Treatment (ADT)  as planned. Patient is likely to benefit from learning and using skills as they work toward the goals identified in their treatment plan.      Jasiel Villarreal.ELIER, LP  March 18, 2025

## 2025-03-18 NOTE — GROUP NOTE
Psychoeducation Group Note    PATIENT'S NAME: Lily Albert  MRN:   [id number]  :   2005  ACCT. NUMBER: [id number]  DATE OF SERVICE: 3/18/25  START TIME: 11:00 AM  END TIME: 11:50 AM  FACILITATOR: Emiliana Liao LICSW  TOPIC: MH Life Skills Group: Lifestyle Balance and Structure  [facility]  TRACK: IOP 2    NUMBER OF PARTICIPANTS: 7    Summary of Group / Topics Discussed:  Lifestyle Balance and Strucure:  Time Management: Time for Tips and Tips for Time: Patients were introduced to how effective time management is beneficial to self esteem, relationships with others, life balance, and other aspects of daily life.   Patients were taught strategies on how to improve time management skills.    Patient Session Goals / Objectives:  Facilitated the discussion on challenges/barriers and personal situations with time management   Identified specific techniques and strategies to improve time management to support mental health recovery     Identified a plan to implement strategies into daily life to support improved time management       Patient Participation / Response:  Fully participated with the group by sharing personal reflections / insights and openly received / provided feedback with other participants.    Verbalized understanding of content    Treatment Plan:  Patient has a current master individualized treatment plan.  See Epic treatment plan for more information.    HERMINIA Strong

## 2025-03-20 ENCOUNTER — HOSPITAL ENCOUNTER (OUTPATIENT)
Dept: BEHAVIORAL HEALTH | Facility: CLINIC | Age: 20
End: 2025-03-20
Attending: PSYCHIATRY & NEUROLOGY
Payer: COMMERCIAL

## 2025-03-20 ENCOUNTER — PATIENT OUTREACH (OUTPATIENT)
Dept: CARE COORDINATION | Facility: CLINIC | Age: 20
End: 2025-03-20
Payer: COMMERCIAL

## 2025-03-20 PROCEDURE — 90853 GROUP PSYCHOTHERAPY: CPT

## 2025-03-20 PROCEDURE — 90853 GROUP PSYCHOTHERAPY: CPT | Performed by: SOCIAL WORKER

## 2025-03-20 NOTE — GROUP NOTE
Process Group Note    PATIENT'S NAME: Lily Albert  MRN:   [id number]  :   2005  ACCT. NUMBER: [id number]  DATE OF SERVICE: 3/20/25  START TIME:  9:00 AM  END TIME:  9:50 AM  FACILITATOR: Catina Reyes LICSW  TOPIC:  Process Group    Diagnoses:    296.32 (F33.1) Major Depressive Disorder, Recurrent Episode, Moderate _ and With anxious distress.  3. Other Diagnoses that is relevant to services:   300.02 (F41.1) Generalized Anxiety Disorder.    [facility]  TRACK: IOP 2     NUMBER OF PARTICIPANTS: 7        Data:    Session content: At the start of this group, patients were invited to check in by identifying themselves, describing their current emotional status, and identifying issues to address in this group.   Area(s) of treatment focus addressed in this session included Symptom Management and Personal Safety.    Patient reported feeling anxious. Patient discussed working toward upcoming appointments. Patient identified cope ahead as skills they will use to address their goal(s). Patient reported self may be a barrier to working toward their goal(s) and/or addressing mental health symptoms. Patient reported no safety concerns and/or self-injurious behaviors. Patient reported no substance use. Patient reported they are taking their medications as prescribed. Patient reported feeling proud that they part of group. Patient discussed cope ahead with the treatment group.              Therapeutic Interventions/Treatment Strategies:  Treatment modalities used include Cognitive Behavioral Therapy and Dialectical Behavioral Therapy.    Assessment:    Patient response:   Patient responded to session by accepting feedback, giving feedback, listening, focusing on goals, and being attentive    Possible barriers to participation / learning include: and no barriers identified    Health Issues:   None reported       Substance Use Review:   Substance Use: No active  concerns identified.    Mental Status/Behavioral Observations  Appearance:   Appropriate   Eye Contact:   Good   Psychomotor Behavior: Normal   Attitude:   Cooperative   Orientation:   All  Speech   Rate / Production: Normal    Volume:  Normal   Mood:    Normal  Affect:    Appropriate   Thought Content:   Clear  Thought Form:  Coherent  Logical     Insight:    Good     Plan:   Safety Plan: No current safety concerns identified.  Recommended that patient call 911 or go to the local ED should there be a change in any of these risk factors.   Barriers to treatment: None identified  Patient Contracts (see media tab):  None  Substance Use: Not addressed in session   Continue or Discharge: Patient will continue in Adult Day Treatment (ADT)  as planned. Patient is likely to benefit from learning and using skills as they work toward the goals identified in their treatment plan.      Catina Reyes, [facility]  March 20, 2025

## 2025-03-20 NOTE — GROUP NOTE
Psychotherapy Group Note    PATIENT'S NAME: Lily Albert  MRN:   [id number]  :   2005  ACCT. NUMBER: [id number]  DATE OF SERVICE: 3/20/25  START TIME: 11:00 AM  END TIME: 11:50 AM  FACILITATOR: Sherrie Tee LICSW  TOPIC: MH EBP Group: Coping Skills  [facility]  TRACK: IOP 2    NUMBER OF PARTICIPANTS: 6    Summary of Group / Topics Discussed:  Coping Skills: Distraction: Patients learned to mindfully use distraction as a way to decrease heightened stress in the moment.  Patients will identified situations that necessitate healthy distraction strategies.  They explored ways to manage physical symptoms of distress using distraction. The group began to distinguish when this can be useful in their lives or when other strategies would be more relevant or helpful.    Patient Session Goals / Objectives:  Understand the purpose and benefits of using healthy distraction to decrease distress.  Process what happens in the body when using distraction strategies.  Demonstrate understanding of when to use distraction strategies.  Explore patient s current distraction activities, and how to take a more intentional approach to the use of distraction.  Identify and problem solve barriers to applying distraction strategies.  Choose 1-2 healthy distraction strategies to apply during times of distress.      Patient Participation / Response:  Fully participated with the group by sharing personal reflections / insights and openly received / provided feedback with other participants.    Demonstrated understanding of topics discussed through group discussion and participation and Expressed understanding of the relevance / importance of coping skills at distressing times in life    Treatment Plan:  Patient has a current master individualized treatment plan.  See Epic treatment plan for more information.    HERMINIA Basilio

## 2025-03-20 NOTE — GROUP NOTE
Psychotherapy Group Note    PATIENT'S NAME: Lily Albert  MRN:   [id number]  :   2005  ACCT. NUMBER: [id number]  DATE OF SERVICE: 3/20/25  START TIME: 10:00 AM  END TIME: 10:50 AM  FACILITATOR: Catina Reyes LICSW  TOPIC: MH EBP Group: Behavioral Activation  [facility]  TRACK: IOP 2     NUMBER OF PARTICIPANTS: 7    Summary of Group / Topics Discussed:  Behavioral Activation: The Change Process - Behavior Change: Patients explored the process and types of change, including but not limited to, theories of change, steps to making change, methods of changing behavior, and potential barriers.  Patients worked to identify what changes may benefit their daily lives, and work towards a plan to implement change.      Patient Session Goals / Objectives:  Demonstrate understanding of the change process.    Identify positive and negative behavioral patterns.  Make plans to track and implement changes and share experiences in group.  Identify personal barriers to change      Patient Participation / Response:  Fully participated with the group by sharing personal reflections / insights and openly received / provided feedback with other participants.    Demonstrated understanding of topics discussed through group discussion and participation, Expressed understanding of the relationship between behaviors, thoughts, and feelings, Shared experiences and challenges with making behavioral changes, and Identified barriers to change    Treatment Plan:  Patient has a current master individualized treatment plan.  See Epic treatment plan for more information.    HERMINIA Chen

## 2025-03-24 ENCOUNTER — HOSPITAL ENCOUNTER (OUTPATIENT)
Dept: BEHAVIORAL HEALTH | Facility: CLINIC | Age: 20
End: 2025-03-24
Attending: PSYCHIATRY & NEUROLOGY
Payer: COMMERCIAL

## 2025-03-24 PROCEDURE — 90853 GROUP PSYCHOTHERAPY: CPT

## 2025-03-24 NOTE — GROUP NOTE
Process Group Note    PATIENT'S NAME: Lily Albert  MRN:   [id number]  :   2005  ACCT. NUMBER: [id number]  DATE OF SERVICE: 3/24/25  START TIME:  9:00 AM  END TIME:  9:50 AM  FACILITATOR: Catina Reyes LICSW  TOPIC:  Process Group    Diagnoses:    296.32 (F33.1) Major Depressive Disorder, Recurrent Episode, Moderate _ and With anxious distress.  3. Other Diagnoses that is relevant to services:   300.02 (F41.1) Generalized Anxiety Disorder.    [facility]  TRACK: IOP 2    NUMBER OF PARTICIPANTS: 7        Data:    Session content: At the start of this group, patients were invited to check in by identifying themselves, describing their current emotional status, and identifying issues to address in this group.   Area(s) of treatment focus addressed in this session included Symptom Management and Personal Safety.      Patient reported feeling disappointed. Patient discussed working toward triggers. Patient identified CBT as skills they will use to address their goal(s). Patient reported self may be a barrier to working toward their goal(s) and/or addressing mental health symptoms. Patient reported no safety concerns and/or self-injurious behaviors. Patient reported no substance use. Patient reported they are taking their medications as prescribed. Patient reported feeling proud that they part of group. Patient discussed sensory with the treatment group.              Therapeutic Interventions/Treatment Strategies:  Treatment modalities used include Cognitive Behavioral Therapy and Dialectical Behavioral Therapy.    Assessment:    Patient response:   Patient responded to session by accepting feedback, giving feedback, listening, and focusing on goals    Possible barriers to participation / learning include: and no barriers identified    Health Issues:   None reported       Substance Use Review:   Substance Use: No active concerns identified.    Mental  Status/Behavioral Observations  Appearance:   Appropriate   Eye Contact:   Good   Psychomotor Behavior: Normal   Attitude:   Cooperative   Orientation:   All  Speech   Rate / Production: Normal    Volume:  Normal   Mood:    Normal  Affect:    Appropriate   Thought Content:   Clear  Thought Form:  Coherent  Logical     Insight:    Good     Plan:   Safety Plan: No current safety concerns identified.  Recommended that patient call 911 or go to the local ED should there be a change in any of these risk factors.   Barriers to treatment: None identified  Patient Contracts (see media tab):  None  Substance Use: Not addressed in session   Continue or Discharge: Patient will continue in Adult Day Treatment (ADT)  as planned. Patient is likely to benefit from learning and using skills as they work toward the goals identified in their treatment plan.      Catina Reyes, [facility]  March 24, 2025

## 2025-03-24 NOTE — GROUP NOTE
Psychotherapy Group Note    PATIENT'S NAME: Lily Albert  MRN:   [id number]  :   2005  ACCT. NUMBER: [id number]  DATE OF SERVICE: 3/24/25  START TIME: 10:00 AM  END TIME: 10:50 AM  FACILITATOR: Catina Reyes LICSW  TOPIC: MH EBP Group: Specialty Awareness  [facility]  TRACK: IOP 2    NUMBER OF PARTICIPANTS: 7    Summary of Group / Topics Discussed:  Specialty Topics: Trauma and PTSD: Patients were provided with information to understand the types and origins of trauma, the relationship between trauma and PTSD, the scope of Trauma-Informed Care, and treatment options. Patients were able to explore how trauma may have impacted their functioning directly or indirectly, with reference to the the complexity of trauma and associated treatment options. Patients reviewed their current awareness of this topic and relevance to their functioning. Individual experiences with symptoms and treatment options were discussed.     Patient Session Goals / Objectives:  Discussed definitions of trauma, Trauma-Informed Care, PTSD  Discussed how traumatic experiences affect the individual and their relationships (directly, indirectly, brain development and function)  Identified how a history of trauma or exposure to trauma may impact group work  Assisted patients to find ways to adapt functioning in light of past traumatic experience(s), and to identify suitable treatment options and community resources      Patient Participation / Response:  Fully participated with the group by sharing personal reflections / insights and openly received / provided feedback with other participants.    Demonstrated understanding of topics discussed through group discussion and participation, Identified / Expressed readiness to act on skill suggestions discussed in topic, and Verbalized understanding of ways to proactively manage illness    Treatment Plan:  Patient has a current master  individualized treatment plan.  See Epic treatment plan for more information.    Catina Reyes, LICSW

## 2025-03-24 NOTE — GROUP NOTE
Psychotherapy Group Note    PATIENT'S NAME: Lily Albert  MRN:   [id number]  :   2005  ACCT. NUMBER: [id number]  DATE OF SERVICE: 3/24/25  START TIME: 11:00 AM  END TIME: 11:50 AM  FACILITATOR: Catina Reyes LICSW  TOPIC:  EBP Group: [facility]  TRACK: IOP 2    NUMBER OF PARTICIPANTS: 7    Summary of Group / Topics Discussed:  Mindfulness: Mindfulness Experiential: Patients received an overview on what mindfulness is and how mindfulness can benefit general health, mental health symptoms, and stressors. The history of mindfulness, its application to mental health therapies, and key concepts were also discussed. Patients discussed current awareness, knowledge, and practice of mindfulness skills. Patients also discussed barriers to mindfulness practice.    Patient Session Goals / Objectives:  Demonstrated and verbalized understanding of key mindfulness concepts  Identified when/how to use mindfulness skills  Resolved barriers to practicing mindfulness skills  Identified plan to use mindfulness skills in daily life       Patient Participation / Response:  Fully participated with the group by sharing personal reflections / insights and openly received / provided feedback with other participants.    Demonstrated understanding of topics discussed through group discussion and participation, Demonstrated understanding of mindfulness skills and benefits of practice, Identified / Expressed personal readiness to practice mindfulness skills, and Verbalized understanding of how mindfulness can benefit mental health symptoms    Treatment Plan:  Patient has a current master individualized treatment plan.  See Epic treatment plan for more information.    HERMINIA Chen

## 2025-03-25 ENCOUNTER — OFFICE VISIT (OUTPATIENT)
Dept: FAMILY MEDICINE | Facility: CLINIC | Age: 20
End: 2025-03-25
Payer: COMMERCIAL

## 2025-03-25 ENCOUNTER — HOSPITAL ENCOUNTER (OUTPATIENT)
Dept: BEHAVIORAL HEALTH | Facility: CLINIC | Age: 20
End: 2025-03-25
Attending: PSYCHIATRY & NEUROLOGY
Payer: COMMERCIAL

## 2025-03-25 VITALS
RESPIRATION RATE: 20 BRPM | OXYGEN SATURATION: 98 % | DIASTOLIC BLOOD PRESSURE: 60 MMHG | HEART RATE: 83 BPM | SYSTOLIC BLOOD PRESSURE: 100 MMHG | BODY MASS INDEX: 22.54 KG/M2 | TEMPERATURE: 97 F | WEIGHT: 156 LBS

## 2025-03-25 DIAGNOSIS — I77.810 AORTIC ROOT DILATATION: ICD-10-CM

## 2025-03-25 DIAGNOSIS — Q87.89 LOEYS-DIETZ SYNDROME: Primary | ICD-10-CM

## 2025-03-25 DIAGNOSIS — I36.1 NONRHEUMATIC TRICUSPID VALVE REGURGITATION: ICD-10-CM

## 2025-03-25 DIAGNOSIS — I71.22 ANEURYSM OF AORTIC ARCH WITHOUT RUPTURE: ICD-10-CM

## 2025-03-25 DIAGNOSIS — B37.31 YEAST INFECTION OF THE VAGINA: ICD-10-CM

## 2025-03-25 PROCEDURE — 99213 OFFICE O/P EST LOW 20 MIN: CPT | Performed by: PEDIATRICS

## 2025-03-25 PROCEDURE — 3074F SYST BP LT 130 MM HG: CPT | Performed by: PEDIATRICS

## 2025-03-25 PROCEDURE — 90853 GROUP PSYCHOTHERAPY: CPT | Performed by: COUNSELOR

## 2025-03-25 PROCEDURE — 90853 GROUP PSYCHOTHERAPY: CPT

## 2025-03-25 PROCEDURE — 3078F DIAST BP <80 MM HG: CPT | Performed by: PEDIATRICS

## 2025-03-25 RX ORDER — TERCONAZOLE 4 MG/G
1 CREAM VAGINAL AT BEDTIME
Qty: 45 G | Refills: 11 | Status: SHIPPED | OUTPATIENT
Start: 2025-03-25

## 2025-03-25 RX ORDER — AMOXICILLIN 500 MG/1
CAPSULE ORAL
Qty: 4 CAPSULE | Refills: 11 | Status: SHIPPED | OUTPATIENT
Start: 2025-03-25

## 2025-03-25 ASSESSMENT — PATIENT HEALTH QUESTIONNAIRE - PHQ9
SUM OF ALL RESPONSES TO PHQ QUESTIONS 1-9: 11
10. IF YOU CHECKED OFF ANY PROBLEMS, HOW DIFFICULT HAVE THESE PROBLEMS MADE IT FOR YOU TO DO YOUR WORK, TAKE CARE OF THINGS AT HOME, OR GET ALONG WITH OTHER PEOPLE: SOMEWHAT DIFFICULT
SUM OF ALL RESPONSES TO PHQ QUESTIONS 1-9: 11

## 2025-03-25 NOTE — GROUP NOTE
Process Group Note    PATIENT'S NAME: Lily Albert  MRN:   [id number]  :   2005  ACCT. NUMBER: [id number]  DATE OF SERVICE: 3/25/25  START TIME:  9:00 AM  END TIME:  9:50 AM  FACILITATOR: Catina Reyes LICSW  TOPIC:  Process Group    Diagnoses:    296.32 (F33.1) Major Depressive Disorder, Recurrent Episode, Moderate _ and With anxious distress.  3. Other Diagnoses that is relevant to services:   300.02 (F41.1) Generalized Anxiety Disorder.    [facility]  TRACK: IOP 2     NUMBER OF PARTICIPANTS: 8        Data:    Session content: At the start of this group, patients were invited to check in by identifying themselves, describing their current emotional status, and identifying issues to address in this group.   Area(s) of treatment focus addressed in this session included Symptom Management and Personal Safety.    Patient reported feeling content. Patient discussed working toward emotion regulation. Patient identified mindfulness as skills they will use to address their goal(s). Patient reported self may be a barrier to working toward their goal(s) and/or addressing mental health symptoms. Patient reported no safety concerns and/or self-injurious behaviors. Patient reported no substance use. Patient reported they are taking their medications as prescribed. Patient reported feeling proud that they part of group. Patient discussed hope with the treatment group.              Therapeutic Interventions/Treatment Strategies:  Treatment modalities used include Cognitive Behavioral Therapy and Dialectical Behavioral Therapy.    Assessment:    Patient response:   Patient responded to session by accepting feedback, giving feedback, listening, and focusing on goals    Possible barriers to participation / learning include: and no barriers identified    Health Issues:   None reported       Substance Use Review:   Substance Use: No active concerns identified.    Mental  Status/Behavioral Observations  Appearance:   Appropriate   Eye Contact:   Good   Psychomotor Behavior: Normal   Attitude:   Cooperative   Orientation:   All  Speech   Rate / Production: Normal    Volume:  Normal   Mood:    Normal  Affect:    Appropriate   Thought Content:   Clear  Thought Form:  Coherent  Logical     Insight:    Good     Plan:   Safety Plan: No current safety concerns identified.  Recommended that patient call 911 or go to the local ED should there be a change in any of these risk factors.   Barriers to treatment: None identified  Patient Contracts (see media tab):  None  Substance Use: Not addressed in session   Continue or Discharge: Patient will continue in Adult Day Treatment (ADT)  as planned. Patient is likely to benefit from learning and using skills as they work toward the goals identified in their treatment plan.      Catina Reyes, [facility]  March 25, 2025

## 2025-03-25 NOTE — GROUP NOTE
Psychoeducation Group Note    PATIENT'S NAME: Lily Albert  MRN:   [id number]  :   2005  ACCT. NUMBER: [id number]  DATE OF SERVICE: 3/25/25  START TIME: 11:00 AM  END TIME: 11:50 AM  FACILITATOR: Praful Barbosa LMFT  TOPIC: MH Wellness Group: Health Maintenance  [facility]  TRACK: IOP2    NUMBER OF PARTICIPANTS: 8    Summary of Group / Topics Discussed:  Health Maintenance: Wellness Goal Setting: Patients were assisted by instructor to identify short term goals to promote their mental health recovery and improve overall health and wellness. Patients were educated on SMART goal setting framework as a strategy to increase outcomes and promote success.    Patient Session Goals / Objectives:  Explained the key concepts of SMART goal setting framework  Identified three goals using SMART goal setting framework  Reviewed concept of balance in the 8 dimensions of wellness as it pertains to goal setting        Patient Participation / Response:  Fully participated with the group by sharing personal reflections / insights and openly received / provided feedback with other participants.    Demonstrated understanding of topics discussed through group discussion and participation, Identified / Expressed personal readiness to practice skills, and Verbalized understanding of health maintenance topic    Treatment Plan:  Patient has a current master individualized treatment plan.  See Epic treatment plan for more information.    CHRISTA Carpenter

## 2025-03-25 NOTE — PROGRESS NOTES
Assessment & Plan     Loeys-Milady syndrome      Yeast infection of the vagina    - terconazole (TERAZOL 7) 0.4 % vaginal cream; Place 1 applicator vaginally at bedtime.    Aortic root dilatation    - amoxicillin (AMOXIL) 500 MG capsule; 4 caps by mouth 1 hour prior to dental procedures    Nonrheumatic tricuspid valve regurgitation    - amoxicillin (AMOXIL) 500 MG capsule; 4 caps by mouth 1 hour prior to dental procedures    Aneurysm of aortic arch without rupture    - amoxicillin (AMOXIL) 500 MG capsule; 4 caps by mouth 1 hour prior to dental procedures        Plan:    Updated accommodation recommendations specific to the technical standards listed by her nursing program.  I modified on the sheet that she provided, if she does need a a letter on letterhead I can transcribe.  She should let me know if this is needed.  Refills provided on dental prophylaxis, amoxicillin 2000 mg 1 hour prior to procedure.  Refill provided on her terconazole to use in case of yeast infection after dental prophylactic antibiotic.  Encouraged her to discuss with her therapist and her pain team whether there are any new self hypnosis apps available related to chronic pain issues.  I provided her with the web map mobile which is through [facility] and tailored to chronic pain.  Also provided her with the nervea sheila which is geared more towards irritable bowel syndrome.  I would wonder if her psychiatrist might have something similar to the nerve sheila related to pain specifically.    Billie Raman MD on 3/25/2025 at 5:31 PM        Xiao Bautista is a 19 year old, presenting for the following health issues:  Consult (Paperwork for nursing school)      3/25/2025     4:34 PM   Additional Questions   Roomed by HALEY Bauer   Accompanied by Self         3/25/2025   Forms   Any forms needing to be completed Yes     History of Present Illness       Reason for visit:  Paperwork She is missing 1 dose(s) of medications per  week.  She is not taking prescribed medications regularly due to remembering to take.          Additional paperwork for nursing school      Here today for follow-up on accommodations needed for nursing school.  Has a list of technical standards that her professors would like us to specifically comment on abilities and consideration of accommodations.  See scanned forms for details.    Additionally she needs refills on her dental antibiotic prophylaxis.  She has a dentist appointment coming up.  She also routinely ends up with vaginal yeast infections after using the amoxicillin.  Typically responds well to terconazole.  Would like refills of this today to have on hand.    Has about 2 weeks left of her partial hospitalization program.  She states it has been useful and she is learning things however it has been somewhat challenging or difficult as most of the other people in her group are older and are experiencing older adult challenges such as divorce and menopause which she does not necessarily relate to.  She did recently changed to a therapist in [place] who specializes in medical trauma and this has been positive.        Objective    /60   Pulse 83   Temp 97  F (36.1  C)   Resp 20   Wt 70.8 kg (156 lb)   SpO2 98%   BMI 22.54 kg/m    Body mass index is 22.54 kg/m .    Physical Exam     General: Fair eye contact, bright affect    Somewhat pale appearing.        Signed Electronically by: Billie Raman MD

## 2025-03-25 NOTE — GROUP NOTE
Psychotherapy Group Note    PATIENT'S NAME: Lily Albert  MRN:   [id number]  :   2005  ACCT. NUMBER: [id number]  DATE OF SERVICE: 3/25/25  START TIME: 10:00 AM  END TIME: 10:50 AM  FACILITATOR: Catina Reyes LICSW  TOPIC: MH EBP Group: Specialty Awareness  [facility] Adult Mental Health Outpatient Programs  TRACK: IOP 2    NUMBER OF PARTICIPANTS: 8    Summary of Group / Topics Discussed:  Specialty Topics: Goal Setting: Provided education and assessment on patient's group programming goals. Evaluated patient's assessment of their ability to participate in groups, share emotions with group members, and openness to the group format. Provided education regarding appropriate individual-based group therapy goals.     Patient Session Goals and Objectives:  -Participate in reflective assessment of group readiness.  -Understand appropriate topics and methods to discuss those topics in group programming.  -Identify and problem solve barriers to group participation.  -Identify strategies to actively cope while engaging in group programming.   -Reflected up individualized treatment plans  -Meet with members of the treatment team to assess goal progress       Patient Participation / Response:  Fully participated with the group by sharing personal reflections / insights and openly received / provided feedback with other participants.    Demonstrated understanding of topics discussed through group discussion and participation, Identified / Expressed readiness to act on skill suggestions discussed in topic, and Verbalized understanding of ways to proactively manage illness    Treatment Plan:  Patient has a current master individualized treatment plan.  See Epic treatment plan for more information.    HERMINIA Chen

## 2025-03-27 ENCOUNTER — HOSPITAL ENCOUNTER (OUTPATIENT)
Dept: BEHAVIORAL HEALTH | Facility: CLINIC | Age: 20
End: 2025-03-27
Attending: PSYCHIATRY & NEUROLOGY
Payer: COMMERCIAL

## 2025-03-27 PROCEDURE — 90853 GROUP PSYCHOTHERAPY: CPT

## 2025-03-27 PROCEDURE — 90853 GROUP PSYCHOTHERAPY: CPT | Performed by: COUNSELOR

## 2025-03-27 NOTE — GROUP NOTE
Process Group Note    PATIENT'S NAME: Lily Albert  MRN:   [id number]  :   2005  ACCT. NUMBER: [id number]  DATE OF SERVICE: 3/27/25  START TIME:  9:00 AM  END TIME:  9:50 AM  FACILITATOR: Catina Reyes LICSW; Kal Marquez LICSW  TOPIC:  Process Group    Diagnoses:    296.32 (F33.1) Major Depressive Disorder, Recurrent Episode, Moderate _ and With anxious distress.  3. Other Diagnoses that is relevant to services:   300.02 (F41.1) Generalized Anxiety Disorder.    [facility] Adult Mental Health Outpatient Programs  TRACK: IOP 2    NUMBER OF PARTICIPANTS: 10        Data:    Session content: At the start of this group, patients were invited to check in by identifying themselves, describing their current emotional status, and identifying issues to address in this group.   Area(s) of treatment focus addressed in this session included Symptom Management and Personal Safety.    Patient reported feeling frustrated. Patient discussed working toward speaking with dad. Patient identified DEAR MAN as skills they will use to address their goal(s). Patient reported self may be a barrier to working toward their goal(s) and/or addressing mental health symptoms. Patient reported no safety concerns and/or self-injurious behaviors. Patient reported no substance use. Patient reported they are taking their medications as prescribed. Patient reported feeling proud that they part of group. Patient discussed interactions with the treatment group.              Therapeutic Interventions/Treatment Strategies:  Treatment modalities used include Cognitive Behavioral Therapy and Dialectical Behavioral Therapy.    Assessment:    Patient response:   Patient responded to session by accepting feedback, giving feedback, listening, and focusing on goals    Possible barriers to participation / learning include: and no barriers identified    Health Issues:   None reported       Substance Use Review:   Substance Use: No  active concerns identified.    Mental Status/Behavioral Observations  Appearance:   Appropriate   Eye Contact:   Good   Psychomotor Behavior: Normal   Attitude:   Cooperative   Orientation:   All  Speech   Rate / Production: Normal    Volume:  Normal   Mood:    Normal  Affect:    Appropriate   Thought Content:   Clear  Thought Form:  Coherent  Logical     Insight:    Good     Plan:   Safety Plan: No current safety concerns identified.  Recommended that patient call 911 or go to the local ED should there be a change in any of these risk factors.   Barriers to treatment: None identified  Patient Contracts (see media tab):  None  Substance Use: Not addressed in session   Continue or Discharge: Patient will continue in Adult Day Treatment (ADT)  as planned. Patient is likely to benefit from learning and using skills as they work toward the goals identified in their treatment plan.      Catina Reyes, [facility]  March 27, 2025

## 2025-03-27 NOTE — GROUP NOTE
Psychoeducation Group Note    PATIENT'S NAME: Lily Albert  MRN:   [id number]  :   2005  ACCT. NUMBER: [id number]  DATE OF SERVICE: 3/27/25  START TIME: 11:00 AM  END TIME: 11:50 AM  FACILITATOR: Praful Barbosa LMFT  TOPIC: MH Wellness Group: Mental Health Maintenance  [facility] Mental [facility] Outpatient Programs  TRACK: IOP2    NUMBER OF PARTICIPANTS: 9    Summary of Group / Topics Discussed:  Mental Health Maintenance:  Stress Management: Patients completed a stress management journal to identify practical ways to cope with stress and reduce stress.    Patient Session Goals / Objectives:  Patients identified effective stress reduction techniques  Patients patients made plans with journaling for coping with stress        Patient Participation / Response:  Fully participated with the group by sharing personal reflections / insights and openly received / provided feedback with other participants.    Demonstrated understanding of topics discussed through group discussion and participation    Treatment Plan:  Patient has a current master individualized treatment plan.  See Epic treatment plan for more information.    CHRISTA Carpenter

## 2025-03-27 NOTE — GROUP NOTE
Psychotherapy Group Note    PATIENT'S NAME: Lily Albert  MRN:   [id number]  :   2005  ACCT. NUMBER: [id number]  DATE OF SERVICE: 3/27/25  START TIME: 10:00 AM  END TIME: 10:50 AM  FACILITATOR: Catina Reyes LICSW  TOPIC:  EBP Group: [facility] Adult Mental Health Outpatient Programs  TRACK: IOP 2     NUMBER OF PARTICIPANTS: 10    Summary of Group / Topics Discussed:  Mindfulness: Mindfulness Skills: Patients received information on the main components of mindfulness. Patients participated in discussion on how to practice observing, describing, and participating in internal and external environments. Relevance of mindfulness skills to overall mental and physical health was explored.  Patients explored and discussed in group their current awareness and knowledge of mindfulness skills as well as barriers to applying skills.    Patient Session Goals / Objectives:  Demonstrated and verbalized understanding of key mindfulness concepts  Identified when/how to use mindfulness skills  Resolved barriers to practicing mindfulness skills  Identified plan to use mindfulness skills in daily life       Patient Participation / Response:  Fully participated with the group by sharing personal reflections / insights and openly received / provided feedback with other participants.    Demonstrated understanding of topics discussed through group discussion and participation, Demonstrated understanding of mindfulness skills and benefits of practice, and Identified / Expressed personal readiness to practice mindfulness skills    Treatment Plan:  Patient has a current master individualized treatment plan.  See Epic treatment plan for more information.    HERMINIA Chen

## 2025-03-29 DIAGNOSIS — F32.2 MAJOR DEPRESSIVE DISORDER, SINGLE EPISODE, SEVERE WITHOUT PSYCHOTIC FEATURES (H): ICD-10-CM

## 2025-03-31 ENCOUNTER — HOSPITAL ENCOUNTER (OUTPATIENT)
Dept: BEHAVIORAL HEALTH | Facility: CLINIC | Age: 20
End: 2025-03-31
Attending: PSYCHIATRY & NEUROLOGY
Payer: COMMERCIAL

## 2025-03-31 PROCEDURE — 90853 GROUP PSYCHOTHERAPY: CPT

## 2025-03-31 PROCEDURE — 90853 GROUP PSYCHOTHERAPY: CPT | Performed by: COUNSELOR

## 2025-03-31 NOTE — GROUP NOTE
Process Group Note    PATIENT'S NAME: Lily Albert  MRN:   [id number]  :   2005  ACCT. NUMBER: [id number]  DATE OF SERVICE: 3/31/25  START TIME:  9:00 AM  END TIME:  9:50 AM  FACILITATOR: Catina Reyes LICSW  TOPIC:  Process Group    Diagnoses:    296.32 (F33.1) Major Depressive Disorder, Recurrent Episode, Moderate _ and With anxious distress.  3. Other Diagnoses that is relevant to services:   300.02 (F41.1) Generalized Anxiety Disorder.    [facility] Adult Mental Health Outpatient Programs  TRACK: IOP 2     NUMBER OF PARTICIPANTS: 10        Data:    Session content: At the start of this group, patients were invited to check in by identifying themselves, describing their current emotional status, and identifying issues to address in this group.   Area(s) of treatment focus addressed in this session included Symptom Management and Personal Safety.    Patient reported feeling tired. Patient discussed working toward identifying emotions. Patient identified wellness as skills they will use to address their goal(s). Patient reported self may be a barrier to working toward their goal(s) and/or addressing mental health symptoms. Patient reported no safety concerns and/or self-injurious behaviors. Patient reported no substance use. Patient reported they are taking their medications as prescribed. Patient reported feeling proud that they part of group. Patient discussed emotions with the treatment group.              Therapeutic Interventions/Treatment Strategies:  Treatment modalities used include Cognitive Behavioral Therapy and Dialectical Behavioral Therapy.    Assessment:    Patient response:   Patient responded to session by accepting feedback, giving feedback, listening, and focusing on goals    Possible barriers to participation / learning include: and no barriers identified    Health Issues:   None reported       Substance Use Review:   Substance Use: No active concerns identified.    Mental  Status/Behavioral Observations  Appearance:   Appropriate   Eye Contact:   Good   Psychomotor Behavior: Normal   Attitude:   Cooperative   Orientation:   All  Speech   Rate / Production: Normal    Volume:  Normal   Mood:    Normal  Affect:    Appropriate   Thought Content:   Clear  Thought Form:  Coherent  Logical     Insight:    Good     Plan:   Safety Plan: No current safety concerns identified.  Recommended that patient call 911 or go to the local ED should there be a change in any of these risk factors.   Barriers to treatment: None identified  Patient Contracts (see media tab):  None  Substance Use: Not addressed in session   Continue or Discharge: Patient will continue in Adult Day Treatment (ADT)  as planned. Patient is likely to benefit from learning and using skills as they work toward the goals identified in their treatment plan.      Catina Reyes, [facility]  March 31, 2025

## 2025-03-31 NOTE — GROUP NOTE
Psychotherapy Group Note    PATIENT'S NAME: Lily Albert  MRN:   [id number]  :   2005  ACCT. NUMBER: [id number]  DATE OF SERVICE: 3/31/25  START TIME: 11:00 AM  END TIME: 11:50 AM  FACILITATOR: Praful Barbosa LMFT  TOPIC:  EBP Group: Self-Awareness  [facility] Mental Health Outpatient Programs  TRACK: IOP2    NUMBER OF PARTICIPANTS: 7    Summary of Group / Topics Discussed:  Self-Awareness: Personal Strengths: Topic focused on assisting patients in identifying personal strengths and how they relate to the management of mental health symptoms. Patients discussed the benefits of acknowledging their personal strengths and their impact on mood improvement, mindfulness, and perspective. Patients worked to increase time spent on recognition and appreciation of what is positive and working in their lives. The goal is to reduce rumination and negative thinking resulting in increased mindfulness and resilience. Patients will work to put skills into practice and problem-solve barriers.     Patient Session Goals / Objectives:  Identified personal strengths  Identified barriers to recognition of personal strengths  Verbalized understanding of strategies to increase use of their strengths in management of daily symptoms      Patient Participation / Response:  Fully participated with the group by sharing personal reflections / insights and openly received / provided feedback with other participants.    Demonstrated understanding of topics discussed through group discussion and participation, Demonstrated understanding of values, strengths, and challenges to learn about themselves, and Practiced skills in session    Treatment Plan:  Patient has a current master individualized treatment plan.  See Epic treatment plan for more information.    CHRISTA Carpenter

## 2025-03-31 NOTE — GROUP NOTE
Psychotherapy Group Note    PATIENT'S NAME: Lily Albert  MRN:   [id number]  :   2005  ACCT. NUMBER: [id number]  DATE OF SERVICE: 3/31/25  START TIME: 10:00 AM  END TIME: 10:50 AM  FACILITATOR: Catina Reyes LICSW  TOPIC:  EBP Group: Self-Awareness  [facility] Mental Health Outpatient Programs  TRACK: IOP 2     NUMBER OF PARTICIPANTS: 10    Summary of Group / Topics Discussed:  Self-Awareness: Self-Compassion: Patients received overview of key concepts in developing self-compassion. Patients discussed mindfulness, self-kindness, and finding common humanity. Patients identified their current approach to problems in their lives and learned skills for increasing self-compassion. Patients identified ways they can put self-compassion skills into practice and problem solve barriers to application of skills.     Patient Session Goals / Objectives:  [place] components of self-compassion  Identify ways to practice self-compassion in daily life  Problem solve barriers to self-compassion practice      Patient Participation / Response:  Fully participated with the group by sharing personal reflections / insights and openly received / provided feedback with other participants.    Demonstrated understanding of topics discussed through group discussion and participation, Demonstrated understanding of values, strengths, and challenges to learn about themselves, and Identified / Expressed readiness to act intentionally, increase self-compassion, promote personal growth    Treatment Plan:  Patient has a current master individualized treatment plan.  See Epic treatment plan for more information.    HERMINIA Chen

## 2025-04-01 ENCOUNTER — HOSPITAL ENCOUNTER (OUTPATIENT)
Dept: BEHAVIORAL HEALTH | Facility: CLINIC | Age: 20
End: 2025-04-01
Attending: PSYCHIATRY & NEUROLOGY
Payer: COMMERCIAL

## 2025-04-01 PROCEDURE — 90853 GROUP PSYCHOTHERAPY: CPT

## 2025-04-01 RX ORDER — ESCITALOPRAM OXALATE 20 MG/1
20 TABLET ORAL AT BEDTIME
Qty: 90 TABLET | Refills: 1 | Status: SHIPPED | OUTPATIENT
Start: 2025-04-01

## 2025-04-01 NOTE — GROUP NOTE
Psychotherapy Group Note    PATIENT'S NAME: Lily Albert  MRN:   [id number]  :   2005  ACCT. NUMBER: [id number]  DATE OF SERVICE: 25  START TIME: 11:00 AM  END TIME: 11:50 AM  FACILITATOR: Emiliana Liao LICSW  TOPIC: MH EBP Group: Specialty Awareness  [facility] Mental Health Outpatient Programs  TRACK: IOP 2    NUMBER OF PARTICIPANTS: 8    Summary of Group / Topics Discussed:  Specialty Topics: Hope: The topic of hope was presented in order to help patients better understand the symptoms of hopelessness and how to become more hopeful. Patients discussed their current awareness of the topic and relevance to their functioning. Individual experiences with symptoms and treatment options were also discussed. Patients explored options for ongoing/future treatment and symptom management.      Patient Session Goals / Objectives:  Discussed definition of hopelessness  Discussed how hopelessness impacts functioning  Set a plan to utilize skills to reduce hopelessness      Patient Participation / Response:  Minimally participated, only when prompted / asked.    Demonstrated understanding of topics discussed through group discussion and participation    Treatment Plan:  Patient has a current master individualized treatment plan.  See Epic treatment plan for more information.    HERMINIA Strong

## 2025-04-01 NOTE — GROUP NOTE
Psychotherapy Group Note    PATIENT'S NAME: Lily Albert  MRN:   [id number]  :   2005  ACCT. NUMBER: [id number]  DATE OF SERVICE: 25  START TIME: 10:00 AM  END TIME: 10:50 AM  FACILITATOR: Catina Reyes LICSW  TOPIC: MH EBP Group: Relationship Skills  [facility] Mental Health Outpatient Programs  TRACK: IOP 2     NUMBER OF PARTICIPANTS: 9    Summary of Group / Topics Discussed:  Relationship Skills: Conflict Resolution: Topic of conflict resolution in interpersonal communication was discussed. Patients received an overview of how communication skills during times of conflict impact symptoms and functioning. Patients discussed their current communication challenges and how this impacts their functioning. Patients also verbalized understanding of skills learned and practiced in session.     Patient Session Goals / Objectives:  Identified and discussed patient individual challenges with communication  Presented and practiced effective communication skills in session  Assisted patients in implementing more effective communication skills in their relationships      Patient Participation / Response:  Fully participated with the group by sharing personal reflections / insights and openly received / provided feedback with other participants.    Demonstrated understanding of topics discussed through group discussion and participation, Demonstrated understanding of relationship skills and communication skills, and Identified / Expressed personal readiness to incorporate effective communication skills    Treatment Plan:  Patient has a current master individualized treatment plan.  See Epic treatment plan for more information.    HERMINIA Chen

## 2025-04-03 ENCOUNTER — HOSPITAL ENCOUNTER (OUTPATIENT)
Dept: BEHAVIORAL HEALTH | Facility: CLINIC | Age: 20
End: 2025-04-03
Attending: PSYCHIATRY & NEUROLOGY
Payer: COMMERCIAL

## 2025-04-03 PROCEDURE — 90853 GROUP PSYCHOTHERAPY: CPT | Performed by: SOCIAL WORKER

## 2025-04-03 PROCEDURE — 90853 GROUP PSYCHOTHERAPY: CPT

## 2025-04-03 NOTE — GROUP NOTE
Psychotherapy Group Note    PATIENT'S NAME: Lily Albert  MRN:   [id number]  :   2005  ACCT. NUMBER: [id number]  DATE OF SERVICE: 25  START TIME: 11:00 AM  END TIME: 11:50 AM  FACILITATOR: Emiliana Liao LICSW  TOPIC: MH EBP Group: Specialty Awareness  [facility] Mental Health Outpatient Programs  TRACK: IOP 2    NUMBER OF PARTICIPANTS: 9    Summary of Group / Topics Discussed:  Specialty Topics: Hope part 2: The topic of hope was presented in order to help patients better understand the symptoms of hopelessness and how to become more hopeful. Patients discussed their current awareness of the topic and relevance to their functioning. Individual experiences with symptoms and treatment options were also discussed. Patients explored options for ongoing/future treatment and symptom management.      Patient Session Goals / Objectives:  Discussed definition of hopelessness  Discussed how hopelessness impacts functioning  Set a plan to utilize skills to reduce hopelessness      Patient Participation / Response:  Moderately participated, sharing some personal reflections / insights and adequately adequately received / provided feedback with other participants.    Demonstrated understanding of topics discussed through group discussion and participation    Treatment Plan:  Patient has a current master individualized treatment plan.  See Epic treatment plan for more information.    HERMINIA Strong

## 2025-04-03 NOTE — GROUP NOTE
"Process Group Note    PATIENT'S NAME: Lily Albert  MRN:   [id number]  :   2005  ACCT. NUMBER: [id number]  DATE OF SERVICE: 25  START TIME:  9:00 AM  END TIME:  9:50 AM  FACILITATOR: Sherrie Tee LICSW  TOPIC:  Process Group    Diagnoses:  296.32 (F33.1) Major Depressive Disorder, Recurrent Episode, Moderate _ and With anxious distress.  3. Other Diagnoses that is relevant to services:   300.02 (F41.1) Generalized Anxiety Disorder.    [facility] Outpatient [facility]  TRACK: IOP 5    NUMBER OF PARTICIPANTS: 9        Data:    Session content: At the start of this group, patients were invited to check in by identifying themselves, describing their current emotional status, and identifying issues to address in this group.     Area(s) of treatment focus addressed in this session included Symptom Management, Personal Safety, Community Resources/Discharge Planning, Abstinence/Relapse Prevention, Develop / Improve Independent Living Skills, and Develop Socialization / Interpersonal Relationship Skills.    Lily reports anxious mood yet feels a bit of excitement. She reports poor concentration, \"my brain is scattered.\" Denies S/I or safety issues. She and her family are looking forward to visiting her brother in [place]. She hasn't seen her brother in three weeks. It is her [birth year] next [birth year].  She reports using coping skills for symptom management including grounding skills.  She had a migrane today and spent about ten minutes in the sensory room for dimmed lighting. She reports medication compliance.         4/3/2025     4:00 PM   Suicide Ideation Check In   Since last session, how often have you had suicidal thoughts? No thoughts of suicide       Therapeutic Interventions/Treatment Strategies:  Psychotherapist offered support, feedback and validation and reinforced use of skills. Treatment modalities used include Cognitive Behavioral " Therapy.    Assessment:    Patient response:   Patient responded to session by verbalizing understanding    Possible barriers to participation / learning include: and no barriers identified    Health Issues:   Yes: Chronic disease management, Associated Psychological Distress       Substance Use Review:   Substance Use: No active concerns identified.    Mental Status/Behavioral Observations  Appearance:   Appropriate   Eye Contact:   Good   Psychomotor Behavior: Normal   Attitude:   Cooperative  Interested  Orientation:   All  Speech   Rate / Production: Normal    Volume:  Normal   Mood:    Anxious   Affect:    Appropriate   Thought Content:   Clear and Safety denies any current safety concerns including suicidal ideation, self-harm, and homicidal ideation  Thought Form:  Coherent  Goal Directed  Logical     Insight:    Good     Plan:   Safety Plan: No current safety concerns identified.  Recommended that patient call 911 or go to the local ED should there be a change in any of these risk factors.   Barriers to treatment: None identified  Patient Contracts (see media tab):  None  Substance Use: Not addressed in session   Continue or Discharge: Patient will continue in Adult Day Treatment (ADT)  as planned. Patient is likely to benefit from learning and using skills as they work toward the goals identified in their treatment plan.      Sherrie Tee, [facility]  April 3, 2025

## 2025-04-03 NOTE — GROUP NOTE
Psychotherapy Group Note    PATIENT'S NAME: Lily Albert  MRN:   [id number]  :   2005  ACCT. NUMBER: [id number]  DATE OF SERVICE: 25  START TIME: 10:00 AM  END TIME: 10:50 AM  FACILITATOR: Sherrie Tee LICSW  TOPIC: MH EBP Group: Relationship Skills  [facility]patient [facility]  TRACK: IOP 2    NUMBER OF PARTICIPANTS: 8    Summary of Group / Topics Discussed:  Relationship Skills: Conflict Resolution: Topic of conflict resolution in interpersonal communication was discussed. Patients received an overview of how communication skills during times of conflict impact symptoms and functioning. Patients discussed their current communication challenges and how this impacts their functioning. Patients also verbalized understanding of skills learned and practiced in session.     Patient Session Goals / Objectives:  Identified and discussed patient individual challenges with communication  Presented and practiced effective communication skills in session  Assisted patients in implementing more effective communication skills in their relationships      Patient Participation / Response:  Fully participated with the group by sharing personal reflections / insights and openly received / provided feedback with other participants.    Demonstrated understanding of topics discussed through group discussion and participation, Demonstrated understanding of relationship skills and communication skills, and Practiced skills in session    Treatment Plan:  Patient has a current master individualized treatment plan.  See Epic treatment plan for more information.    HERMINIA Basilio

## 2025-04-07 ENCOUNTER — TRANSFERRED RECORDS (OUTPATIENT)
Dept: HEALTH INFORMATION MANAGEMENT | Facility: CLINIC | Age: 20
End: 2025-04-07
Payer: COMMERCIAL

## 2025-04-08 ENCOUNTER — HOSPITAL ENCOUNTER (OUTPATIENT)
Dept: BEHAVIORAL HEALTH | Facility: CLINIC | Age: 20
End: 2025-04-08
Attending: PSYCHIATRY & NEUROLOGY
Payer: COMMERCIAL

## 2025-04-08 PROCEDURE — 90853 GROUP PSYCHOTHERAPY: CPT

## 2025-04-08 NOTE — GROUP NOTE
Psychotherapy Group Note    PATIENT'S NAME: Lily Albert  MRN:   [id number]  :   2005  ACCT. NUMBER: [id number]  DATE OF SERVICE: 25  START TIME: 10:00 AM  END TIME: 10:50 AM  FACILITATOR: Catina Reyes LICSW  TOPIC:  EBP Group: Emotions Management  [facility] Outpatient Programs  TRACK: IOP 2     NUMBER OF PARTICIPANTS: 7    Summary of Group / Topics Discussed:  Emotions Management: Model of Emotions: Patients were introduced to the cyclical model of emotions.  Explored emotions are shaped by different events and one s interpretation of events.  Group discussed how emotions begin with an event, followed by one s interpretation, followed by associated feelings.  Discussion included a review of personal urges and actions that can/do follow an emotional experience in the patient s life, and the end results.    Patient Session Goals / Objectives:  Demonstrate understanding of types various emotions.  Identify and discuss specific emotions and when they occur; understand triggers.  Identify individual emotions and physical sensations that accompany them.  Discuss urges and actions, and how to influence the intensity of emotional reactions and disrupt the cycle.    Discuss barriers to emotional regulation.  Choose 1-2 strategies to assist with emotional response to potentially distressing situations.      Patient Participation / Response:  Fully participated with the group by sharing personal reflections / insights and openly received / provided feedback with other participants.    Demonstrated understanding of topics discussed through group discussion and participation, Expressed understanding of the relevance / importance of emotions management skills at distressing times in life, and Self-aware of experiences with difficult emotions, and strategies to employ to manage them    Treatment Plan:  Patient has a current master individualized treatment plan.  See Epic treatment  plan for more information.    Catina Reyes, LICSW

## 2025-04-08 NOTE — GROUP NOTE
Process Group Note    PATIENT'S NAME: Lily Albert  MRN:   [id number]  :   2005  ACCT. NUMBER: [id number]  DATE OF SERVICE: 25  START TIME:  9:00 AM  END TIME:  9:50 AM  FACILITATOR: Catina Reyes LICSW  TOPIC:  Process Group    Diagnoses:    296.32 (F33.1) Major Depressive Disorder, Recurrent Episode, Moderate _ and With anxious distress.  Other Diagnoses that is relevant to services:   300.02 (F41.1) Generalized Anxiety Disorder.    [facility] Outpatient [facility]  TRACK: IOP 2     NUMBER OF PARTICIPANTS: 7        Data:    Session content: At the start of this group, patients were invited to check in by identifying themselves, describing their current emotional status, and identifying issues to address in this group.   Area(s) of treatment focus addressed in this session included Symptom Management and Personal Safety.    Patient reported feeling neutral. Patient discussed working toward goals. Patient identified grounding as skills they will use to address their goal(s). Patient reported self may be a barrier to working toward their goal(s) and/or addressing mental health symptoms. Patient reported no safety concerns and/or self-injurious behaviors. Patient reported no substance use. Patient reported they are taking their medications as prescribed. Patient reported feeling proud that they part of group. Patient discussed self love with the treatment group.              Therapeutic Interventions/Treatment Strategies:  Treatment modalities used include Cognitive Behavioral Therapy and Dialectical Behavioral Therapy.    Assessment:    Patient response:   Patient responded to session by accepting feedback, giving feedback, listening, and focusing on goals    Possible barriers to participation / learning include: and no barriers identified    Health Issues:   None reported       Substance Use Review:   Substance Use: No active concerns identified.    Mental  Status/Behavioral Observations  Appearance:   Appropriate   Eye Contact:   Good   Psychomotor Behavior: Normal   Attitude:   Cooperative   Orientation:   All  Speech   Rate / Production: Normal    Volume:  Normal   Mood:    Normal  Affect:    Appropriate   Thought Content:   Clear  Thought Form:  Coherent  Logical     Insight:    Good     Plan:   Safety Plan: No current safety concerns identified.  Recommended that patient call 911 or go to the local ED should there be a change in any of these risk factors.   Barriers to treatment: None identified  Patient Contracts (see media tab):  None  Substance Use: Not addressed in session   Continue or Discharge: Patient will continue in Adult Day Treatment (ADT)  as planned. Patient is likely to benefit from learning and using skills as they work toward the goals identified in their treatment plan.      Catina Reyes, HERMINIA  April 8, 2025

## 2025-04-08 NOTE — GROUP NOTE
Psychotherapy Group Note    PATIENT'S NAME: Lily Albert  MRN:   [id number]  :   2005  ACCT. NUMBER: [id number]  DATE OF SERVICE: 25  START TIME: 11:00 AM  END TIME: 11:50 AM  FACILITATOR: Emiliana Liao LICSW  TOPIC: MH EBP Group: Coping Skills  [facility] Outpatient Programs  TRACK: IOP 2    NUMBER OF PARTICIPANTS: 7    Summary of Group / Topics Discussed:  Coping Skills: Additional Coping Skills:  Patients discussed and practiced TIPP skills, a way to reduce extreme emotion fast.  Reviewed the benefits of applying the aforementioned coping strategies.  Patients explored how these strategies might be applied to daily stressors or distressing situations.    Patient Session Goals / Objectives:  Understand the purpose and benefits of applying TIPP coping strategies  Identify ways to decrease extreme emotion fast ie: temperature, intense exercise, paced breathing and paired breathing  Address barriers to utilizing coping skills when in distress.      Patient Participation / Response:  Moderately participated, sharing some personal reflections / insights and adequately adequately received / provided feedback with other participants.    Demonstrated understanding of topics discussed through group discussion and participation, Expressed understanding of the relevance / importance of coping skills at distressing times in life, and Demonstrated knowledge of when to consider using a variety of coping skills in daily life    Treatment Plan:  Patient has a current master individualized treatment plan.  See Epic treatment plan for more information.    HERMINIA Strong

## 2025-04-09 ENCOUNTER — LAB (OUTPATIENT)
Dept: LAB | Facility: CLINIC | Age: 20
End: 2025-04-09
Payer: COMMERCIAL

## 2025-04-09 DIAGNOSIS — G25.81 RESTLESS LEGS SYNDROME (RLS): ICD-10-CM

## 2025-04-09 DIAGNOSIS — Z78.9 TAKES DIETARY SUPPLEMENTS: ICD-10-CM

## 2025-04-09 LAB
FERRITIN SERPL-MCNC: 46 NG/ML (ref 6–175)
IRON BINDING CAPACITY (ROCHE): 247 UG/DL (ref 240–430)
IRON SATN MFR SERPL: 29 % (ref 15–46)
IRON SERPL-MCNC: 72 UG/DL (ref 37–145)
TRANSFERRIN SERPL-MCNC: 223 MG/DL (ref 200–360)

## 2025-04-09 PROCEDURE — 84466 ASSAY OF TRANSFERRIN: CPT

## 2025-04-09 PROCEDURE — 83540 ASSAY OF IRON: CPT

## 2025-04-09 PROCEDURE — 36415 COLL VENOUS BLD VENIPUNCTURE: CPT

## 2025-04-09 PROCEDURE — 82728 ASSAY OF FERRITIN: CPT

## 2025-04-15 ENCOUNTER — OFFICE VISIT (OUTPATIENT)
Dept: FAMILY MEDICINE | Facility: CLINIC | Age: 20
End: 2025-04-15
Attending: PEDIATRICS
Payer: COMMERCIAL

## 2025-04-15 VITALS
HEIGHT: 70 IN | SYSTOLIC BLOOD PRESSURE: 104 MMHG | HEART RATE: 95 BPM | BODY MASS INDEX: 22.35 KG/M2 | TEMPERATURE: 97.3 F | DIASTOLIC BLOOD PRESSURE: 68 MMHG | OXYGEN SATURATION: 98 % | RESPIRATION RATE: 20 BRPM | WEIGHT: 156.1 LBS

## 2025-04-15 DIAGNOSIS — Q87.89 LOEYS-DIETZ SYNDROME: ICD-10-CM

## 2025-04-15 DIAGNOSIS — Z00.129 ENCOUNTER FOR ROUTINE CHILD HEALTH EXAMINATION W/O ABNORMAL FINDINGS: Primary | ICD-10-CM

## 2025-04-15 PROCEDURE — 96127 BRIEF EMOTIONAL/BEHAV ASSMT: CPT | Performed by: PEDIATRICS

## 2025-04-15 PROCEDURE — 3074F SYST BP LT 130 MM HG: CPT | Performed by: PEDIATRICS

## 2025-04-15 PROCEDURE — 3078F DIAST BP <80 MM HG: CPT | Performed by: PEDIATRICS

## 2025-04-15 PROCEDURE — 99395 PREV VISIT EST AGE 18-39: CPT | Performed by: PEDIATRICS

## 2025-04-15 RX ORDER — HYOSCYAMINE SULFATE 0.12 MG/1
TABLET ORAL
Qty: 90 TABLET | Refills: 5 | Status: SHIPPED | OUTPATIENT
Start: 2025-04-15

## 2025-04-15 SDOH — HEALTH STABILITY: PHYSICAL HEALTH: ON AVERAGE, HOW MANY DAYS PER WEEK DO YOU ENGAGE IN MODERATE TO STRENUOUS EXERCISE (LIKE A BRISK WALK)?: 0 DAYS

## 2025-04-15 SDOH — HEALTH STABILITY: PHYSICAL HEALTH: ON AVERAGE, HOW MANY MINUTES DO YOU ENGAGE IN EXERCISE AT THIS LEVEL?: 0 MIN

## 2025-04-15 NOTE — PATIENT INSTRUCTIONS
Patient Education    [facility] HANDOUT- PATIENT  18 THROUGH 21 YEAR VISITS  Here are some suggestions from [employer] experts that may be of value to your family.     HOW YOU ARE DOING  Enjoy spending time with your family.  Find activities you are really interested in, such as sports, theater, or volunteering.  Try to be responsible for your schoolwork or work obligations.  Always talk through problems and never use violence.  If you get angry with someone, try to walk away.  If you feel unsafe in your home or have been hurt by someone, let us know. Hotlines and community agencies can also provide confidential help.  Talk with us if you are worried about your living or food situation. Community agencies and programs such as SNAP can help.  Don t smoke, vape, or use drugs. Avoid people who do when you can. Talk with us if you are worried about alcohol or drug use in your family.    YOUR DAILY LIFE  Visit the dentist at least twice a year.  Brush your teeth at least twice a day and floss once a day.  Be a healthy eater.  Have vegetables, fruits, lean protein, and whole grains at meals and snacks.  Limit fatty, sugary, salty foods that are low in nutrients, such as candy, chips, and ice cream.  Eat when you re hungry. Stop when you feel satisfied.  Eat breakfast.  Drink plenty of water.  Make sure to get enough calcium every day.  Have 3 or more servings of low-fat (1%) or fat-free milk and other low-fat dairy products, such as yogurt and cheese.  Women: Make sure to eat foods rich in folate, such as fortified grains and dark- green leafy vegetables.  Aim for at least 1 hour of physical activity every day.  Wear safety equipment when you play sports.  Get enough sleep.  Talk with us about managing your health care and insurance as an adult.    YOUR FEELINGS  Most people have ups and downs. If you are feeling sad, depressed, nervous, irritable, hopeless, or angry, let us know or reach out to another health  care professional.  Figure out healthy ways to deal with stress.  Try your best to solve problems and make decisions on your own.  Sexuality is an important part of your life. If you have any questions or concerns, we are here for you.    HEALTHY BEHAVIOR CHOICES  Avoid using drugs, alcohol, tobacco, steroids, and diet pills. Support friends who choose not to use.  If you use drugs or alcohol, let us know or talk with another trusted adult about it. We can help you with quitting or cutting down on your use.  Make healthy decisions about your sexual behavior.  If you are sexually active, always practice safe sex. Always use birth control along with a condom to prevent pregnancy and sexually transmitted infections.  All sexual activity should be something you want. No one should ever force or try to convince you.  Protect your hearing at work, home, and concerts. Keep your earbud volume down.    STAYING SAFE  Always be a safe and cautious .  Insist that everyone use a lap and shoulder seat belt.  Limit the number of friends in the car and avoid driving at night.  Avoid distractions. Never text or talk on the phone while you drive.  Do not ride in a vehicle with someone who has been using drugs or alcohol.  If you feel unsafe driving or riding with someone, call someone you trust to drive you.  Wear helmets and protective gear while playing sports. Wear a helmet when riding a bike, a motorcycle, or an ATV or when skiing or skateboarding.  Always use sunscreen and a hat when you re outside.  Fighting and carrying weapons can be dangerous. Talk with your parents, teachers, or doctor about how to avoid these situations.        Consistent with Bright Futures: Guidelines for Health Supervision of Infants, Children, and Adolescents, 4th Edition  For more information, go to https://brightfutures.aap.org.

## 2025-04-15 NOTE — PROGRESS NOTES
Preventive Care Visit  [facility]  Billie Raman MD, Pediatrics  Apr 15, 2025    Assessment & Plan   20 year old, here for preventive care.    Encounter for routine child health examination w/o abnormal findings    - BEHAVIORAL/EMOTIONAL ASSESSMENT (84843)  - Lipid Profile -NON-FASTING; Future    Loeys-Milady syndrome    - hyoscyamine (LEVSIN) 0.125 MG tablet; TAKE 1 TABLET(125 MCG) BY MOUTH DAILY IN THE MORNING    Plan:    Anticipatory guidance reviewed.  Encouraged her to continue to work through her feelings regarding her need to change career paths.  Reassured her this is a common occurrence and she is certainly not alone.  She should continue to lean on her family supports, therapy supports.  Agree with continuing with once weekly therapy and working on new ways to deal with the stress and loss.  Will schedule a repeat fasting lipid panel in the next few weeks.  Plan to recheck on her moods and depression medications in 2 to 3 months, sooner if needed.  Return to clinic 1 year for recheck.    Billie Raman MD on 4/15/2025 at 2:52 PM    Patient has been advised of split billing requirements and indicates understanding: Yes              Xiao Bautista is presenting for the following:  Physical (20 year physical)    Here today for wellness exam.    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  She states that the email she received did not provide which specific accommodations they were unable to meet.  She has met with a  and the  is going to talk to the legal department about whether or not this qualifies as discrimination.  She feels quite upset as this changes what she envisions doing for the rest of her life.  She also ended her outpatient psychiatry program on Friday.  Feels her moods are okay.  She has cried related to the loss of her future  plans multiple times per day since this occurred but does feel she is able to do other things as well.  Has thought of some ideas to get herself through including restarting her worry box she used to do when she was younger.  She does state her parents are being quite supportive and checking in with her routinely.  She will see her therapist again on Thursday.  Currently is going 1 time per week. Did get to talk with her brother James this weekend and that was helpful.     Health maintenance: Due to family history of breast cancer she will require mammography starting at age 24.  She did have her tubes tied but still has her uterus and cervix and will need a Pap smear at 21, routine colonoscopy at age 50 as she did not have any of the genes increasing her risk for colon cancer.    Tells me her diet could be better.  She states she sometimes gets lazy and it is hard for her to stand for long periods of time to cook things.    Sleep is going really well.  She did have a sleep study which showed some mild sleep apnea when she is supine but she always sleeps on her side and there were no sleep apnea concerns when she was sleeping on her side.        4/15/2025     2:08 PM   Additional Questions   Accompanied by Self         4/15/2025   Forms   Any forms needing to be completed Yes         4/15/2025   Social   Lives with Family   Recent potential stressors (!) SCHOOL PROBLEMS   History of trauma Unknown   Family Hx of mental health challenges No   Lack of transportation has limited access to appts/meds No   Do you have housing? (Housing is defined as stable permanent housing and does not include staying ouside in a car, in a tent, in an abandoned building, in an overnight shelter, or couch-surfing.) Yes   Are you worried about losing your housing? No         4/15/2025     1:52 PM   Health Risks/Safety   Do you always wear a seat belt? Yes   Helmet use? Yes         3/29/2024     1:20 PM   TB Screening   Were you born  outside of the [place]? No         4/15/2025   TB Screening: Consider immunosuppression as a risk factor for TB   Recent TB infection or positive TB test in patient/family/close contact No   Recent residence in high-risk group setting (correctional facility/health care facility/homeless shelter) No              4/15/2025     1:52 PM   Dyslipidemia   FH: premature cardiovascular disease No, these conditions are not present in the patient's biologic parents or grandparents   FH: hyperlipidemia No   Personal risk factors for heart disease NO diabetes, high blood pressure, obesity, smokes cigarettes, kidney problems, heart or kidney transplant, history of Kawasaki disease with an aneurysm, lupus, rheumatoid arthritis, or HIV     Recent Labs   Lab Test 01/05/24  0817 04/17/23  0751   CHOL 177* 183*   HDL 28* 29*   * 117*   TRIG 168* 187*           4/15/2025     1:52 PM   Diet   What type of water? Tap    (!) BOTTLED   Please specify: trying to do better         4/15/2025   Diet   Do you have questions about your eating?  No   Do you have questions about your weight?  No   What do you regularly drink? Water    (!) JUICE    (!) POP   What type of water? Tap    (!) BOTTLED   Do you think you eat healthy foods? (!) NO   Please specify: trying to do better   At least 3 servings of food or beverages that have calcium each day? (!) NO   How would you describe your diet?  No restrictions   In past 12 months, concerned food might run out No   In past 12 months, food has run out/couldn't afford more No       Multiple values from one day are sorted in reverse-chronological order         4/15/2025   Activity   Days per week of moderate/strenuous exercise 0 days   On average, how many minutes do you engage in exercise at this level? 0 min   What do you do for exercise? none   What activities are you involved with? nothing right now         4/15/2025     1:52 PM   Media Use   Hours per day of screen time (for  "entertainment) 8-10         4/15/2025     1:52 PM   Sleep   Do you have any trouble with sleep? (!) DAYTIME DROWSINESS OR TAKE NAPS    (!) DIFFICULTY FALLING ASLEEP         4/15/2025     1:52 PM   School   Are you in school? Yes   What school do you attend?  [employer]   What do you do for work? not working         4/15/2025     1:52 PM   Vision/Hearing   Vision or hearing concerns No concerns         Teen Screen    Teen Screen not completed: Not given to patient        4/15/2025     1:52 PM   AMB [facility] MENSES SECTION   What are your periods like?  (!) HEAVY FLOW    (!) LASTING MORE THAN 8 DAYS          Objective     Exam  /68   Pulse 95   Temp 97.3  F (36.3  C)   Resp 20   Ht 1.765 m (5' 9.5\")   Wt 70.8 kg (156 lb 1.6 oz)   LMP 04/14/2025   SpO2 98%   BMI 22.72 kg/m    Facility age limit for growth %daniel is 20 years.  Facility age limit for growth %daniel is 20 years.  Facility age limit for growth %daniel is 20 years.  Growth %ile [employer] can only be used for patients less than 20 years old.    Vision Screen       Hearing Screen       Physical Exam    GENERAL: Active, alert, in no acute distress.  SKIN: Clear. No significant rash, abnormal pigmentation or lesions  HEAD: Normocephalic  EYES: Pupils equal, round, reactive, Extraocular muscles intact. Normal conjunctivae.  EARS: Normal canals. Tympanic membranes are normal; gray and translucent.  NOSE: Normal without discharge.  MOUTH/THROAT: Clear. No oral lesions.   NECK: Supple, no masses.  No thyromegaly.  LYMPH NODES: No adenopathy  LUNGS: Clear. No rales, rhonchi, wheezing or retractions  HEART: Regular rhythm. Normal S1/S2. No murmurs. Normal pulses.  ABDOMEN: Soft, non-tender, not distended, no masses or hepatosplenomegaly. Bowel sounds normal.   NEUROLOGIC: No focal findings. Cranial nerves grossly intact: DTR's normal. Normal gait, strength and tone  BACK: Spine is straight, no scoliosis.  EXTREMITIES: Full range of motion, no deformities, Feet are " cool to touch and purple/bluish hue.   : Normal female external genitalia, Sunny stage V.   BREASTS:  Sunny stage V.  No abnormalities.      Signed Electronically by: Billie Raman MD

## 2025-04-21 ENCOUNTER — TRANSFERRED RECORDS (OUTPATIENT)
Dept: HEALTH INFORMATION MANAGEMENT | Facility: CLINIC | Age: 20
End: 2025-04-21
Payer: COMMERCIAL

## 2025-04-21 ENCOUNTER — MYC REFILL (OUTPATIENT)
Dept: FAMILY MEDICINE | Facility: CLINIC | Age: 20
End: 2025-04-21
Payer: COMMERCIAL

## 2025-04-21 DIAGNOSIS — F90.9 ATTENTION DEFICIT HYPERACTIVITY DISORDER (ADHD), UNSPECIFIED ADHD TYPE: ICD-10-CM

## 2025-04-21 RX ORDER — METHYLPHENIDATE HYDROCHLORIDE 18 MG/1
18 TABLET ORAL DAILY
Qty: 30 TABLET | Refills: 0 | Status: SHIPPED | OUTPATIENT
Start: 2025-04-21

## 2025-05-05 ENCOUNTER — TRANSFERRED RECORDS (OUTPATIENT)
Dept: HEALTH INFORMATION MANAGEMENT | Facility: CLINIC | Age: 20
End: 2025-05-05
Payer: COMMERCIAL

## 2025-05-19 ENCOUNTER — TRANSFERRED RECORDS (OUTPATIENT)
Dept: HEALTH INFORMATION MANAGEMENT | Facility: CLINIC | Age: 20
End: 2025-05-19
Payer: COMMERCIAL

## 2025-05-21 ENCOUNTER — MYC REFILL (OUTPATIENT)
Dept: FAMILY MEDICINE | Facility: CLINIC | Age: 20
End: 2025-05-21
Payer: COMMERCIAL

## 2025-05-21 DIAGNOSIS — F90.9 ATTENTION DEFICIT HYPERACTIVITY DISORDER (ADHD), UNSPECIFIED ADHD TYPE: ICD-10-CM

## 2025-05-22 RX ORDER — METHYLPHENIDATE HYDROCHLORIDE 18 MG/1
18 TABLET ORAL DAILY
Qty: 30 TABLET | Refills: 0 | Status: SHIPPED | OUTPATIENT
Start: 2025-05-22

## 2025-05-29 ENCOUNTER — TRANSFERRED RECORDS (OUTPATIENT)
Dept: HEALTH INFORMATION MANAGEMENT | Facility: CLINIC | Age: 20
End: 2025-05-29
Payer: COMMERCIAL

## 2025-06-09 ENCOUNTER — TRANSFERRED RECORDS (OUTPATIENT)
Dept: HEALTH INFORMATION MANAGEMENT | Facility: CLINIC | Age: 20
End: 2025-06-09
Payer: COMMERCIAL

## 2025-06-12 ENCOUNTER — TRANSFERRED RECORDS (OUTPATIENT)
Dept: HEALTH INFORMATION MANAGEMENT | Facility: CLINIC | Age: 20
End: 2025-06-12
Payer: COMMERCIAL

## 2025-06-13 ENCOUNTER — TRANSFERRED RECORDS (OUTPATIENT)
Dept: HEALTH INFORMATION MANAGEMENT | Facility: CLINIC | Age: 20
End: 2025-06-13
Payer: COMMERCIAL

## 2025-06-17 ENCOUNTER — OFFICE VISIT (OUTPATIENT)
Dept: FAMILY MEDICINE | Facility: CLINIC | Age: 20
End: 2025-06-17
Payer: COMMERCIAL

## 2025-06-17 VITALS
TEMPERATURE: 97.2 F | RESPIRATION RATE: 20 BRPM | BODY MASS INDEX: 22.42 KG/M2 | HEART RATE: 105 BPM | SYSTOLIC BLOOD PRESSURE: 106 MMHG | DIASTOLIC BLOOD PRESSURE: 60 MMHG | HEIGHT: 70 IN | WEIGHT: 156.6 LBS | OXYGEN SATURATION: 99 %

## 2025-06-17 DIAGNOSIS — F90.9 ATTENTION DEFICIT HYPERACTIVITY DISORDER (ADHD), UNSPECIFIED ADHD TYPE: Primary | ICD-10-CM

## 2025-06-17 DIAGNOSIS — Q87.89 LOEYS-DIETZ SYNDROME: ICD-10-CM

## 2025-06-17 DIAGNOSIS — F32.2 MAJOR DEPRESSIVE DISORDER, SINGLE EPISODE, SEVERE WITHOUT PSYCHOTIC FEATURES (H): ICD-10-CM

## 2025-06-17 PROCEDURE — 99213 OFFICE O/P EST LOW 20 MIN: CPT | Performed by: PEDIATRICS

## 2025-06-17 PROCEDURE — 3074F SYST BP LT 130 MM HG: CPT | Performed by: PEDIATRICS

## 2025-06-17 PROCEDURE — 3078F DIAST BP <80 MM HG: CPT | Performed by: PEDIATRICS

## 2025-06-17 RX ORDER — METHYLPHENIDATE HYDROCHLORIDE 18 MG/1
18 TABLET ORAL DAILY
Qty: 30 TABLET | Refills: 0 | Status: SHIPPED | OUTPATIENT
Start: 2025-08-16 | End: 2025-09-15

## 2025-06-17 RX ORDER — METHYLPHENIDATE HYDROCHLORIDE 18 MG/1
18 TABLET ORAL DAILY
Qty: 30 TABLET | Refills: 0 | Status: SHIPPED | OUTPATIENT
Start: 2025-06-17 | End: 2025-07-17

## 2025-06-17 RX ORDER — ESCITALOPRAM OXALATE 20 MG/1
20 TABLET ORAL AT BEDTIME
Qty: 90 TABLET | Refills: 1 | Status: SHIPPED | OUTPATIENT
Start: 2025-06-17

## 2025-06-17 RX ORDER — METHYLPHENIDATE HYDROCHLORIDE 18 MG/1
18 TABLET ORAL DAILY
Qty: 30 TABLET | Refills: 0 | Status: SHIPPED | OUTPATIENT
Start: 2025-07-17 | End: 2025-08-16

## 2025-06-17 ASSESSMENT — ANXIETY QUESTIONNAIRES
2. NOT BEING ABLE TO STOP OR CONTROL WORRYING: NOT AT ALL
GAD7 TOTAL SCORE: 5
7. FEELING AFRAID AS IF SOMETHING AWFUL MIGHT HAPPEN: NOT AT ALL
IF YOU CHECKED OFF ANY PROBLEMS ON THIS QUESTIONNAIRE, HOW DIFFICULT HAVE THESE PROBLEMS MADE IT FOR YOU TO DO YOUR WORK, TAKE CARE OF THINGS AT HOME, OR GET ALONG WITH OTHER PEOPLE: SOMEWHAT DIFFICULT
8. IF YOU CHECKED OFF ANY PROBLEMS, HOW DIFFICULT HAVE THESE MADE IT FOR YOU TO DO YOUR WORK, TAKE CARE OF THINGS AT HOME, OR GET ALONG WITH OTHER PEOPLE?: SOMEWHAT DIFFICULT
7. FEELING AFRAID AS IF SOMETHING AWFUL MIGHT HAPPEN: NOT AT ALL
4. TROUBLE RELAXING: SEVERAL DAYS
5. BEING SO RESTLESS THAT IT IS HARD TO SIT STILL: SEVERAL DAYS
3. WORRYING TOO MUCH ABOUT DIFFERENT THINGS: SEVERAL DAYS
GAD7 TOTAL SCORE: 5
1. FEELING NERVOUS, ANXIOUS, OR ON EDGE: SEVERAL DAYS
6. BECOMING EASILY ANNOYED OR IRRITABLE: SEVERAL DAYS
GAD7 TOTAL SCORE: 5

## 2025-06-17 ASSESSMENT — PATIENT HEALTH QUESTIONNAIRE - PHQ9
SUM OF ALL RESPONSES TO PHQ QUESTIONS 1-9: 10
SUM OF ALL RESPONSES TO PHQ QUESTIONS 1-9: 10
10. IF YOU CHECKED OFF ANY PROBLEMS, HOW DIFFICULT HAVE THESE PROBLEMS MADE IT FOR YOU TO DO YOUR WORK, TAKE CARE OF THINGS AT HOME, OR GET ALONG WITH OTHER PEOPLE: SOMEWHAT DIFFICULT

## 2025-06-17 NOTE — PROGRESS NOTES
Assessment & Plan     Major depressive disorder, single episode, severe without psychotic features (H)    - escitalopram (LEXAPRO) 20 MG tablet; Take 1 tablet (20 mg) by mouth at bedtime.    Attention deficit hyperactivity disorder (ADHD), unspecified ADHD type    - methylphenidate HCl ER, OSM, (CONCERTA) 18 MG CR tablet; Take 1 tablet (18 mg) by mouth daily.  - methylphenidate HCl ER, OSM, (CONCERTA) 18 MG CR tablet; Take 1 tablet (18 mg) by mouth daily.  - methylphenidate HCl ER, OSM, (CONCERTA) 18 MG CR tablet; Take 1 tablet (18 mg) by mouth daily.  - methylphenidate HCl ER, OSM, (CONCERTA) 18 MG CR tablet; Take 1 tablet (18 mg) by mouth daily.  - methylphenidate HCl ER, OSM, (CONCERTA) 18 MG CR tablet; Take 1 tablet (18 mg) by mouth daily.  - methylphenidate HCl ER, OSM, (CONCERTA) 18 MG CR tablet; Take 1 tablet (18 mg) by mouth daily.    Loeys-Milady syndrome            Plan:    Continue current Lexapro 20 mg once daily.  Continue Concerta 18 mg once daily.  She should be in contact via portal if she needs any refills.  Medications have been stable from my standpoint for a long period of time.  Reasonable for us to plan to follow-up in 6 months, sooner if she needs anything in the interim.    Billie Raman MD on 6/17/2025 at 3:07 PM      Xiao Bautista is a 20 year old, presenting for the following health issues:  Recheck Medication (Follow up on and renew meds)      6/17/2025     2:20 PM   Additional Questions   Roomed by HALEY Bauer   Accompanied by Self     History of Present Illness       Mental Health Follow-up:  Patient presents to follow-up on Depression & Anxiety.Patient's depression since last visit has been:  Better  The patient is not having other symptoms associated with depression.  Patient's anxiety since last visit has been:  Better  The patient is not having other symptoms associated with anxiety.  Any significant life events: health concerns  Patient is not feeling anxious or having  "panic attacks.  Patient has no concerns about alcohol or drug use.    She eats 2-3 servings of fruits and vegetables daily.She consumes 3 sweetened beverage(s) daily.She exercises with enough effort to increase her heart rate 9 or less minutes per day.  She exercises with enough effort to increase her heart rate 3 or less days per week. She is missing 1 dose(s) of medications per week.  She is not taking prescribed medications regularly due to remembering to take.          Depression   How are you doing with your depression since your last visit? Improved \"doing ok\"      Social History     Tobacco Use    Smoking status: Never     Passive exposure: Never    Smokeless tobacco: Never   Vaping Use    Vaping status: Never Used   Substance Use Topics    Alcohol use: Never    Drug use: Never         2/10/2025    11:00 AM 3/25/2025     4:22 PM 6/17/2025     2:07 PM   PHQ   PHQ-9 Total Score 13 11  10    Q9: Thoughts of better off dead/self-harm past 2 weeks Not at all Not at all Not at all       Patient-reported         2/5/2025     1:36 PM 2/11/2025    12:00 PM 6/17/2025     2:08 PM   ANKIT-7 SCORE   Total Score   5 (mild anxiety)   Total Score 14 12 5        Patient-reported     Here today for medication follow up.     Celebrex twice daily all the time and has helped a ton with pain.  Tried Tramadol for when it gets bad.  Hasn't used that one yet.      Sleep is good.      College is starting to look at orientation dates for [employer] .  Is optimistic but is further away.  Will need to get an apartment over there.  Is thinking about starting in the fall there.  Some of her credits will transfer.      Moods are where you want them to be. Parents would agree.  No suicidal thoughts.  Stays on Concerta daily- doesn't notice too much if she misses a day.  If she misses other medications notices the pain symptoms more.      Had some new pain and didn't see anything.  Is looking to do horseback riding.  Yesterday rode a horse and this " "was good.  Last time she had ridden was the fair last year.  Will be back riding horse once per week.   Done at [facility].  Finished her class.  Started another bisphosphonate infusion therapy.  Thought bones were good and hadn't broken anything and then broke her spine.  Started that the first week of June.  So will do this every 6 months (1/2 dose).  Will have the infusion [facility] in [facility].  Dr. Ykoo Cuevas.  Did confer with endocrinology with [facility].  Does generally feel bad after for a week.  Did think of PTH injections- this apparently helps the vertebra specifically.      Started OT twice per week.  Finding new things she can do that doesn't cause too much pain.  Did try to tell her that they didn't want to do anything that causes pain.  Movement causes pain.  Is trying new chairs.  Different positions.  Some of the chairs move.  Different activities she can do in bed.      With [facility] will do aquatic PT,  Had stress fracture T 11.      Cardiology in July.            Objective    /60   Pulse 105   Temp 97.2  F (36.2  C)   Resp 20   Ht 1.765 m (5' 9.5\")   Wt 71 kg (156 lb 9.6 oz)   SpO2 99%   BMI 22.79 kg/m    Body mass index is 22.79 kg/m .    Physical Exam       General:  Bright affect, good eye contact.           Signed Electronically by: Billie Raman MD    "

## 2025-07-08 ENCOUNTER — TRANSFERRED RECORDS (OUTPATIENT)
Dept: HEALTH INFORMATION MANAGEMENT | Facility: CLINIC | Age: 20
End: 2025-07-08
Payer: COMMERCIAL

## 2025-07-14 ENCOUNTER — TRANSFERRED RECORDS (OUTPATIENT)
Dept: HEALTH INFORMATION MANAGEMENT | Facility: CLINIC | Age: 20
End: 2025-07-14
Payer: COMMERCIAL

## 2025-07-17 ENCOUNTER — TRANSFERRED RECORDS (OUTPATIENT)
Dept: HEALTH INFORMATION MANAGEMENT | Facility: CLINIC | Age: 20
End: 2025-07-17
Payer: COMMERCIAL

## 2025-07-17 LAB — EJECTION FRACTION: 54 %

## 2025-07-29 DIAGNOSIS — Q87.89 LOEYS-DIETZ SYNDROME: Primary | ICD-10-CM

## 2025-08-04 ENCOUNTER — OFFICE VISIT (OUTPATIENT)
Dept: FAMILY MEDICINE | Facility: CLINIC | Age: 20
End: 2025-08-04
Payer: COMMERCIAL

## 2025-08-04 VITALS
OXYGEN SATURATION: 98 % | TEMPERATURE: 97.3 F | WEIGHT: 158 LBS | HEART RATE: 108 BPM | HEIGHT: 70 IN | DIASTOLIC BLOOD PRESSURE: 62 MMHG | RESPIRATION RATE: 20 BRPM | SYSTOLIC BLOOD PRESSURE: 100 MMHG | BODY MASS INDEX: 22.62 KG/M2

## 2025-08-04 DIAGNOSIS — Z30.011 ENCOUNTER FOR INITIAL PRESCRIPTION OF CONTRACEPTIVE PILLS: Primary | ICD-10-CM

## 2025-08-04 DIAGNOSIS — Q87.89 LOEYS-DIETZ SYNDROME: ICD-10-CM

## 2025-08-04 DIAGNOSIS — M81.8 OTHER OSTEOPOROSIS, UNSPECIFIED PATHOLOGICAL FRACTURE PRESENCE: ICD-10-CM

## 2025-08-04 DIAGNOSIS — G43.109 MIGRAINE WITH AURA AND WITHOUT STATUS MIGRAINOSUS, NOT INTRACTABLE: ICD-10-CM

## 2025-08-04 PROCEDURE — 3078F DIAST BP <80 MM HG: CPT | Performed by: PEDIATRICS

## 2025-08-04 PROCEDURE — 99214 OFFICE O/P EST MOD 30 MIN: CPT | Performed by: PEDIATRICS

## 2025-08-04 PROCEDURE — 3074F SYST BP LT 130 MM HG: CPT | Performed by: PEDIATRICS

## 2025-08-04 RX ORDER — ACETAMINOPHEN AND CODEINE PHOSPHATE 120; 12 MG/5ML; MG/5ML
0.35 SOLUTION ORAL DAILY
Qty: 84 TABLET | Refills: 4 | Status: SHIPPED | OUTPATIENT
Start: 2025-08-04

## 2025-08-04 ASSESSMENT — PATIENT HEALTH QUESTIONNAIRE - PHQ9
10. IF YOU CHECKED OFF ANY PROBLEMS, HOW DIFFICULT HAVE THESE PROBLEMS MADE IT FOR YOU TO DO YOUR WORK, TAKE CARE OF THINGS AT HOME, OR GET ALONG WITH OTHER PEOPLE: SOMEWHAT DIFFICULT
SUM OF ALL RESPONSES TO PHQ QUESTIONS 1-9: 11
SUM OF ALL RESPONSES TO PHQ QUESTIONS 1-9: 11

## 2025-08-07 ENCOUNTER — VIRTUAL VISIT (OUTPATIENT)
Dept: PHARMACY | Facility: CLINIC | Age: 20
End: 2025-08-07
Payer: COMMERCIAL

## 2025-08-07 DIAGNOSIS — G25.81 RESTLESS LEGS SYNDROME (RLS): Primary | ICD-10-CM

## 2025-08-07 DIAGNOSIS — R79.0 LOW FERRITIN: ICD-10-CM

## 2025-08-07 DIAGNOSIS — R51.9 NONINTRACTABLE HEADACHE, UNSPECIFIED CHRONICITY PATTERN, UNSPECIFIED HEADACHE TYPE: ICD-10-CM

## 2025-08-07 DIAGNOSIS — K59.00 CONSTIPATION, UNSPECIFIED CONSTIPATION TYPE: ICD-10-CM

## 2025-08-07 RX ORDER — CELECOXIB 100 MG/1
100 CAPSULE ORAL 2 TIMES DAILY
COMMUNITY

## 2025-08-07 RX ORDER — KETOTIFEN FUMARATE 0.35 MG/ML
1 SOLUTION/ DROPS OPHTHALMIC 2 TIMES DAILY PRN
COMMUNITY

## 2025-08-07 RX ORDER — CELECOXIB 200 MG/1
200 CAPSULE ORAL 2 TIMES DAILY
COMMUNITY

## 2025-08-07 RX ORDER — TRAMADOL HYDROCHLORIDE 50 MG/1
25-50 TABLET ORAL EVERY 6 HOURS PRN
COMMUNITY

## 2025-08-11 ENCOUNTER — TRANSFERRED RECORDS (OUTPATIENT)
Dept: HEALTH INFORMATION MANAGEMENT | Facility: CLINIC | Age: 20
End: 2025-08-11
Payer: COMMERCIAL

## 2025-08-17 ENCOUNTER — MYC REFILL (OUTPATIENT)
Dept: FAMILY MEDICINE | Facility: CLINIC | Age: 20
End: 2025-08-17
Payer: COMMERCIAL

## 2025-08-17 DIAGNOSIS — F90.9 ATTENTION DEFICIT HYPERACTIVITY DISORDER (ADHD), UNSPECIFIED ADHD TYPE: ICD-10-CM

## 2025-08-18 RX ORDER — METHYLPHENIDATE HYDROCHLORIDE 18 MG/1
18 TABLET ORAL DAILY
Qty: 30 TABLET | Refills: 0 | OUTPATIENT
Start: 2025-08-18

## (undated) DEVICE — GLOVE BIOGEL PI ULTRATOUCH G SZ 6.5 42165

## (undated) DEVICE — BUR ARTHREX COOLCUT DISSECTOR 3.5MM  AR-8350DS

## (undated) DEVICE — DECANTER TRANSFER DEVICE 2008S

## (undated) DEVICE — Device

## (undated) DEVICE — GOWN IMPERVIOUS SPECIALTY XLG/XLONG 32474

## (undated) DEVICE — LIGHT HANDLE X2

## (undated) DEVICE — CAST PADDING 6" STERILE 9046S

## (undated) DEVICE — PREP DYNA-HEX 4% CHG SCRUB 4OZ BOTTLE MDS098710

## (undated) DEVICE — LINEN TOWEL PACK X5 5464

## (undated) DEVICE — ESU PENCIL W/SMOKE EVAC NEPTUNE STRYKER 0703-046-000

## (undated) DEVICE — SU VICRYL 2-0 SH 27" UND J417H

## (undated) DEVICE — NDL INSUFFLATION 13GA 120MM C2201

## (undated) DEVICE — SU MONOCRYL 3-0 PS-1 27" Y936H

## (undated) DEVICE — PAD CHUX UNDERPAD 30X36" P3036C

## (undated) DEVICE — ENDO POUCH UNIVERSAL RETRIEVAL SYSTEM INZII 5MM CD003

## (undated) DEVICE — COVER CAMERA IN-LIGHT DISP LT-C02

## (undated) DEVICE — ESU LIGASURE LAPAROSCOPIC BLUNT TIP SEALER 5MMX37CM LF1837

## (undated) DEVICE — SUCTION MANIFOLD NEPTUNE 2 SYS 4 PORT 0702-020-000

## (undated) DEVICE — DRSG STERI STRIP 1/2X4" R1547

## (undated) DEVICE — PANTIES MESH LG/XLG 2PK 706M2

## (undated) DEVICE — TUBING SMOKE EVAC PNEUMOCLEAR HIGH FLOW 0620050250

## (undated) DEVICE — JELLY LUBRICATING SURGILUBE 2OZ TUBE 0281-0205-02

## (undated) DEVICE — ENDO TROCAR FIRST ENTRY KII FIOS ADV FIX 05X100MM CFF03

## (undated) DEVICE — GLOVE BIOGEL PI MICRO INDICATOR UNDERGLOVE SZ 7.0 48970

## (undated) DEVICE — CATH TRAY FOLEY SURESTEP 16FR WDRAIN BAG STLK LATEX A300316A

## (undated) DEVICE — SOL ADH LIQUID BENZOIN SWAB 0.6ML C1544

## (undated) DEVICE — TUBING ARTHROSCOPY PUMP ARTHREX AR-6410

## (undated) DEVICE — SOL NACL 0.9% IRRIG 1000ML BOTTLE 2F7124

## (undated) DEVICE — ESU GROUND PAD ADULT W/CORD E7507

## (undated) DEVICE — ESU HOLDER LAP INST DISP PURPLE LONG 330MM H-PRO-330

## (undated) DEVICE — SU ETHILON 3-0 PS-1 18" 1663H

## (undated) DEVICE — SU VICRYL 2-0 CT-2 27" UND J269H

## (undated) DEVICE — STRAP KNEE/BODY 31143004

## (undated) DEVICE — GLOVE BIOGEL PI SZ 7.5 40875

## (undated) DEVICE — LINEN GOWN X4 5410

## (undated) DEVICE — SU MONOCRYL 4-0 PS-2 18" UND Y496G

## (undated) DEVICE — LINEN ORTHO PACK 5446

## (undated) DEVICE — DRAPE C-ARM W/STRAPS 42X72" 07-CA104

## (undated) DEVICE — PAD PERI INDIV WRAP 11" 2022A

## (undated) DEVICE — ESU GROUND PAD UNIVERSAL W/O CORD

## (undated) DEVICE — IMM KNEE 20" 0814-2660

## (undated) DEVICE — SOL NACL 0.9% IRRIG 3000ML BAG 2B7477

## (undated) DEVICE — SU DERMABOND ADVANCED .7ML DNX12

## (undated) DEVICE — SOL NACL 0.9% INJ 1000ML BAG 2B1324X

## (undated) DEVICE — SU VICRYL 0 CT-1 27" J340H

## (undated) DEVICE — TRAY PREP DRY SKIN SCRUB 067

## (undated) DEVICE — GLOVE BIOGEL PI MICRO SZ 7.5 48575

## (undated) DEVICE — ENDO TROCAR SLEEVE KII ADV FIXATION 05X100MM CFS02

## (undated) RX ORDER — ACETAMINOPHEN 325 MG/1
TABLET ORAL
Status: DISPENSED
Start: 2023-02-15

## (undated) RX ORDER — KETOROLAC TROMETHAMINE 15 MG/ML
INJECTION, SOLUTION INTRAMUSCULAR; INTRAVENOUS
Status: DISPENSED
Start: 2023-02-15

## (undated) RX ORDER — ONDANSETRON 2 MG/ML
INJECTION INTRAMUSCULAR; INTRAVENOUS
Status: DISPENSED
Start: 2024-06-18

## (undated) RX ORDER — FENTANYL CITRATE 0.05 MG/ML
INJECTION, SOLUTION INTRAMUSCULAR; INTRAVENOUS
Status: DISPENSED
Start: 2023-02-15

## (undated) RX ORDER — OXYCODONE HYDROCHLORIDE 5 MG/1
TABLET ORAL
Status: DISPENSED
Start: 2023-02-15

## (undated) RX ORDER — ONDANSETRON 2 MG/ML
INJECTION INTRAMUSCULAR; INTRAVENOUS
Status: DISPENSED
Start: 2023-02-15

## (undated) RX ORDER — ACETAMINOPHEN 325 MG/1
TABLET ORAL
Status: DISPENSED
Start: 2024-06-18

## (undated) RX ORDER — BUPIVACAINE HYDROCHLORIDE 2.5 MG/ML
INJECTION, SOLUTION EPIDURAL; INFILTRATION; INTRACAUDAL
Status: DISPENSED
Start: 2024-06-18

## (undated) RX ORDER — OXYCODONE HYDROCHLORIDE 5 MG/1
TABLET ORAL
Status: DISPENSED
Start: 2024-06-18

## (undated) RX ORDER — FENTANYL CITRATE 50 UG/ML
INJECTION, SOLUTION INTRAMUSCULAR; INTRAVENOUS
Status: DISPENSED
Start: 2024-06-18

## (undated) RX ORDER — KETOROLAC TROMETHAMINE 30 MG/ML
INJECTION, SOLUTION INTRAMUSCULAR; INTRAVENOUS
Status: DISPENSED
Start: 2024-06-18

## (undated) RX ORDER — FENTANYL CITRATE 50 UG/ML
INJECTION, SOLUTION INTRAMUSCULAR; INTRAVENOUS
Status: DISPENSED
Start: 2023-02-15

## (undated) RX ORDER — EPHEDRINE SULFATE 50 MG/ML
INJECTION, SOLUTION INTRAMUSCULAR; INTRAVENOUS; SUBCUTANEOUS
Status: DISPENSED
Start: 2024-06-18

## (undated) RX ORDER — CEFAZOLIN SODIUM/WATER 2 G/20 ML
SYRINGE (ML) INTRAVENOUS
Status: DISPENSED
Start: 2023-02-15

## (undated) RX ORDER — DEXAMETHASONE SODIUM PHOSPHATE 4 MG/ML
INJECTION, SOLUTION INTRA-ARTICULAR; INTRALESIONAL; INTRAMUSCULAR; INTRAVENOUS; SOFT TISSUE
Status: DISPENSED
Start: 2023-02-15